# Patient Record
Sex: FEMALE | Race: WHITE | Employment: FULL TIME | URBAN - NONMETROPOLITAN AREA
[De-identification: names, ages, dates, MRNs, and addresses within clinical notes are randomized per-mention and may not be internally consistent; named-entity substitution may affect disease eponyms.]

---

## 2021-07-17 ENCOUNTER — HOSPITAL ENCOUNTER (EMERGENCY)
Age: 26
Discharge: HOME | End: 2021-07-17
Attending: EMERGENCY MEDICINE
Payer: COMMERCIAL

## 2021-07-17 VITALS
SYSTOLIC BLOOD PRESSURE: 118 MMHG | OXYGEN SATURATION: 99 % | RESPIRATION RATE: 16 BRPM | TEMPERATURE: 98.8 F | WEIGHT: 130 LBS | HEART RATE: 82 BPM | DIASTOLIC BLOOD PRESSURE: 82 MMHG

## 2021-07-17 DIAGNOSIS — N93.9 VAGINAL BLEEDING: Primary | ICD-10-CM

## 2021-07-17 LAB
ABO: NORMAL
ALBUMIN SERPL-MCNC: 4 GM/DL (ref 3.4–5)
ALP BLD-CCNC: 64 U/L (ref 46–116)
ALT SERPL-CCNC: 21 U/L (ref 14–63)
ANION GAP: 11 MEQ/L (ref 8–16)
AST SERPL-CCNC: 17 U/L (ref 15–37)
BASOPHILS # BLD: 0.4 % (ref 0–3)
BILIRUB SERPL-MCNC: 0.5 MG/DL (ref 0.2–1)
BUN BLDV-MCNC: 10 MG/DL (ref 7–18)
CHLORIDE BLD-SCNC: 108 MEQ/L (ref 98–107)
CO2: 25 MEQ/L (ref 21–32)
CREAT SERPL-MCNC: 0.8 MG/DL (ref 0.6–1.3)
EOSINOPHILS RELATIVE PERCENT: 1.7 % (ref 0–4)
GFR, ESTIMATED: > 90 ML/MIN/1.73M2
GLUCOSE BLD-MCNC: 97 MG/DL (ref 74–106)
HCG,BETA SUBUNIT,QUAL,SERUM: 5.2 MIU/ML (ref 0–5)
HCT VFR BLD CALC: 41.8 % (ref 37–47)
HEMOGLOBIN: 14 GM/DL (ref 12–16)
LYMPHOCYTES # BLD: 16.9 % (ref 15–47)
MCH RBC QN AUTO: 29.5 PG (ref 27–31)
MCHC RBC AUTO-ENTMCNC: 33.5 GM/DL (ref 33–37)
MCV RBC AUTO: 88.2 FL (ref 81–99)
MONOCYTES: 5.8 % (ref 0–12)
PDW BLD-RTO: 13.1 % (ref 11.5–14.5)
PLATELET # BLD: 290 THOU/MM3 (ref 130–400)
PMV BLD AUTO: 8.6 FL (ref 7.4–10.4)
POC CALCIUM: 8.6 MG/DL (ref 8.5–10.1)
POTASSIUM SERPL-SCNC: 3.9 MEQ/L (ref 3.5–5.1)
PREGNANCY, SERUM: NEGATIVE
RBC # BLD: 4.74 MILL/MM3 (ref 4.2–5.4)
RH FACTOR: NORMAL
SEGS: 75.2 % (ref 43–75)
SODIUM BLD-SCNC: 144 MEQ/L (ref 136–145)
TOTAL PROTEIN: 6.7 GM/DL (ref 6.4–8.2)
WBC # BLD: 8 THOU/MM3 (ref 4.8–10.8)

## 2021-07-17 PROCEDURE — 80053 COMPREHEN METABOLIC PANEL: CPT

## 2021-07-17 PROCEDURE — 85025 COMPLETE CBC W/AUTO DIFF WBC: CPT

## 2021-07-17 PROCEDURE — 2580000003 HC RX 258: Performed by: EMERGENCY MEDICINE

## 2021-07-17 PROCEDURE — 36415 COLL VENOUS BLD VENIPUNCTURE: CPT

## 2021-07-17 PROCEDURE — 84703 CHORIONIC GONADOTROPIN ASSAY: CPT

## 2021-07-17 PROCEDURE — 86900 BLOOD TYPING SEROLOGIC ABO: CPT

## 2021-07-17 PROCEDURE — 99283 EMERGENCY DEPT VISIT LOW MDM: CPT

## 2021-07-17 PROCEDURE — 86901 BLOOD TYPING SEROLOGIC RH(D): CPT

## 2021-07-17 PROCEDURE — 84702 CHORIONIC GONADOTROPIN TEST: CPT

## 2021-07-17 PROCEDURE — 96374 THER/PROPH/DIAG INJ IV PUSH: CPT

## 2021-07-17 RX ORDER — 0.9 % SODIUM CHLORIDE 0.9 %
1000 INTRAVENOUS SOLUTION INTRAVENOUS ONCE
Status: COMPLETED | OUTPATIENT
Start: 2021-07-17 | End: 2021-07-17

## 2021-07-17 RX ADMIN — SODIUM CHLORIDE 1000 ML: 9 INJECTION, SOLUTION INTRAVENOUS at 07:12

## 2021-07-17 ASSESSMENT — PAIN DESCRIPTION - ORIENTATION: ORIENTATION: LOWER

## 2021-07-17 ASSESSMENT — PAIN SCALES - GENERAL: PAINLEVEL_OUTOF10: 4

## 2021-07-17 ASSESSMENT — PAIN DESCRIPTION - LOCATION: LOCATION: ABDOMEN

## 2021-07-17 ASSESSMENT — PAIN DESCRIPTION - DESCRIPTORS: DESCRIPTORS: CRAMPING

## 2021-07-17 NOTE — ED NOTES
Report to [facility] RN and care assumed.       Harjinder [facility], RN  07/17/21 7017

## 2021-07-17 NOTE — ED PROVIDER NOTES
[address]  [place] [address]  Phone: [address]    Chief Complaint   Patient presents with    Vaginal Bleeding       HPI    Janeth Hudson is a 31 y.o. female who presents above-noted complaint. Patient presents with vaginal bleeding. She states she is missed her last cycle been trying to get pregnant and had home pregnancy test that was positive this was positive over a week ago. No other associate symptoms she developed some bleeding today. She had some cramping felt little heavier than normal.  Denies other symptoms at this time in regards to fever chills although felt a little shaky earlier and was not sure if she lost some blood. She denies unilateral pain although maybe feels a little funny on the right side although not severe pain or other problems. PAST MEDICAL HISTORY    History reviewed. No pertinent past medical history. SURGICAL HISTORY    History reviewed. No pertinent surgical history. CURRENT MEDICATIONS    Current Outpatient Rx   Medication Sig Dispense Refill    Prenatal MV-Min-Fe Fum-FA-DHA (PRENATAL 1 PO) Take by mouth         ALLERGIES    No Known Allergies    FAMILY HISTORY    History reviewed. No pertinent family history.     SOCIAL HISTORY    Social History     Socioeconomic History    Marital status:      Spouse name: None    Number of children: None    Years of education: None    Highest education level: None   Occupational History    None   Tobacco Use    Smoking status: Never Smoker    Smokeless tobacco: Never Used   Substance and Sexual Activity    Alcohol use: Never    Drug use: Never    Sexual activity: Never   Other Topics Concern    None   Social History Narrative    None     Social Determinants of Health     Financial Resource Strain:     Difficulty of Paying Living Expenses:    Food Insecurity:     Worried About Running Out of Food in the Last Year:     Ran Out of Food in the Last Year:    Transportation Needs:     Lack of Transportation (Medical):  Lack of Transportation (Non-Medical):    Physical Activity:     Days of Exercise per Week:     Minutes of Exercise per Session:    Stress:     Feeling of Stress :    Social Connections:     Frequency of Communication with Friends and Family:     Frequency of Social Gatherings with Friends and Family:     Attends Rastafari Services:     Active Member of Clubs or Organizations:     Attends Club or Organization Meetings:     Marital Status:    Intimate Partner Violence:     Fear of Current or Ex-Partner:     Emotionally Abused:     Physically Abused:     Sexually Abused:        REVIEW OF SYSTEMS    Positive for vaginal bleeding. No chest pain or shortness of breath. No nausea vomiting diaphoresis. All systems negative except as marked. PHYSICAL EXAM    VITAL SIGNS: /82   Pulse 82   Temp 98.8 °F (37.1 °C)   Resp 16   Wt 130 lb (59 kg)   SpO2 99%    Constitutional:  Alert not toxic or ill,   HENT: COVID mask protection in place normocephalic, Atraumatic, mask lowered to assess  Bilateral external ears normal, Oropharynx moist, No oral exudates, Nose normal.  Cervical Spine: Normal range of motion,  No stridor. No tenderness, Supple,  Eyes:  No discharge or  Swelling,Conjunctiva normal, PERRL, EOMI,  Respiratory: No respiratory distress, Normal breath sounds,  No wheezing, No chest tenderness. Cardiovascular:  Normal heart rate, Normal rhythm, No murmurs, No rubs, No gallops. GI:  No reproducible pain, Bowel sounds normal, Soft, No masses, No pulsatile masses. No tenderness  Musculoskeletal:  Intact distal pulses, No edema, No tenderness, No cyanosis, No clubbing. Good range of motion in all major joints. No tenderness to palpation or major deformities noted. Back:No tenderness.    Integument:  Warm, Dry, No erythema, No rash (on exposed areas)   Lymphatic:  No lymphadenopathy noted. Neurologic:  Alert & oriented x 3, Normal motor function, Normal sensory function, No focal deficits noted. Psychiatric:  Affect normal, Judgment normal, Mood normal.     EKG                      RADIOLOGY    No orders to display       PROCEDURES    none      CONSULTS:  None      CRITICAL CARE:  None    SCREENINGS  /82   Pulse 82   Temp 98.8 °F (37.1 °C)   Resp 16   Wt 130 lb (59 kg)   SpO2 99%        Screening For Hypertension and Follow-up (#317)  patient informed that blood pressure is normal but should always be re-assessed by primary care      Screening For Tobacco Use and Cessation Intervention (#226):   reports that she has never smoked. She has never used smokeless tobacco.  Non-smoker not applicable for screen      ED COURSE & MEDICAL DECISION MAKING    Pertinent Labs & Imaging studies reviewed. (See chart for details)  Patient presents with vaginal bleeding. No associate symptoms such as severe pain chest pain shortness of breath severe abdominal pain or other issues. She states she about 5 weeks pregnant. Her exam here is otherwise benign without peritonitis or unilateral pain. Counseled her in regards to threatened AB at this time. We are checking a blood count. She does not know her blood type. We are doing a quantitative hCG. I informed her this is a send out laboratory data and this will need a repeat in 2 days. REASSESSMENT  7:54 AM  Patient rechecked and updated on lab/xray status, progress and results. .  Patient was reassessed and condition was unchanged after tx. No further needs at this time. Lab tests including hemoglobin is unremarkable. Qualitative hCG is actually negative. Her numbers may actually just be too low. We have sent the quantitative hCG to main [place]. Recommend a repeat quantitative hCG in 2 days. Vital signs remained stable. FINAL IMPRESSION    1.  Vaginal bleeding         PATIENT REFERRED TO:  Yoel Francois, MD [address]  [address]  [phone]    Call   For evaluation      DISCHARGE MEDICATIONS:  New Prescriptions    No medications on file           Pratik Raymond MD  07/17/21 6226

## 2021-07-17 NOTE — ED TRIAGE NOTES
Pt states she is 5 weeks pregnant. Last evening she developed vaginal bleeding that was heavy at apx 2100 yesterday. Light bleeding and abdominal cramping has persisted.

## 2021-07-17 NOTE — ED NOTES
Pt resting, resp even and unlabored, skin pale, warm and dry. Pt states bleeding is minimal at this time. IV discontinued, 900ml's infused. Pt given discharge instructions and verbalizes understanding, pt released.      Tacos Lewis RN  07/17/21 9137

## 2021-10-11 ENCOUNTER — HOSPITAL ENCOUNTER (OUTPATIENT)
Age: 26
Discharge: HOME | End: 2021-10-11
Payer: COMMERCIAL

## 2021-10-11 PROCEDURE — 36415 COLL VENOUS BLD VENIPUNCTURE: CPT

## 2021-10-11 PROCEDURE — 84144 ASSAY OF PROGESTERONE: CPT

## 2021-10-11 PROCEDURE — 84702 CHORIONIC GONADOTROPIN TEST: CPT

## 2021-10-12 LAB
HCG,BETA SUBUNIT,QUAL,SERUM: 86.7 MIU/ML (ref 0–5)
PROGESTERONE LEVEL: 49.87 NG/ML

## 2021-10-13 ENCOUNTER — HOSPITAL ENCOUNTER (OUTPATIENT)
Age: 26
Discharge: HOME | End: 2021-10-13
Payer: COMMERCIAL

## 2021-10-13 LAB — HCG,BETA SUBUNIT,QUAL,SERUM: 320.6 MIU/ML (ref 0–5)

## 2021-10-13 PROCEDURE — 84702 CHORIONIC GONADOTROPIN TEST: CPT

## 2021-10-13 PROCEDURE — 36415 COLL VENOUS BLD VENIPUNCTURE: CPT

## 2021-11-12 ENCOUNTER — HOSPITAL ENCOUNTER (OUTPATIENT)
Age: 26
Discharge: HOME | End: 2021-11-12
Payer: COMMERCIAL

## 2021-11-12 LAB
ERYTHROCYTE [DISTWIDTH] IN BLOOD BY AUTOMATED COUNT: 13.6 % (ref 11.5–14.5)
ERYTHROCYTE [DISTWIDTH] IN BLOOD BY AUTOMATED COUNT: 46.5 FL (ref 35–45)
HCT VFR BLD CALC: 40.9 % (ref 37–47)
HEMOGLOBIN: 12.7 GM/DL (ref 12–16)
MCH RBC QN AUTO: 28.7 PG (ref 26–33)
MCHC RBC AUTO-ENTMCNC: 31.1 GM/DL (ref 32.2–35.5)
MCV RBC AUTO: 92.5 FL (ref 81–99)
PLATELET # BLD: 278 THOU/MM3 (ref 130–400)
PMV BLD AUTO: 11.7 FL (ref 9.4–12.4)
RBC # BLD: 4.42 MILL/MM3 (ref 4.2–5.4)
WBC # BLD: 9.2 THOU/MM3 (ref 4.8–10.8)

## 2021-11-12 PROCEDURE — 86900 BLOOD TYPING SEROLOGIC ABO: CPT

## 2021-11-12 PROCEDURE — 36415 COLL VENOUS BLD VENIPUNCTURE: CPT

## 2021-11-12 PROCEDURE — 86762 RUBELLA ANTIBODY: CPT

## 2021-11-12 PROCEDURE — 87389 HIV-1 AG W/HIV-1&-2 AB AG IA: CPT

## 2021-11-12 PROCEDURE — 87340 HEPATITIS B SURFACE AG IA: CPT

## 2021-11-12 PROCEDURE — 86803 HEPATITIS C AB TEST: CPT

## 2021-11-12 PROCEDURE — 85027 COMPLETE CBC AUTOMATED: CPT

## 2021-11-12 PROCEDURE — 86901 BLOOD TYPING SEROLOGIC RH(D): CPT

## 2021-11-12 PROCEDURE — 87086 URINE CULTURE/COLONY COUNT: CPT

## 2021-11-12 PROCEDURE — 86592 SYPHILIS TEST NON-TREP QUAL: CPT

## 2021-11-12 PROCEDURE — 86850 RBC ANTIBODY SCREEN: CPT

## 2021-11-13 LAB
ABO: NORMAL
ANTIBODY SCREEN: NORMAL
HEPATITIS B SURFACE ANTIGEN: NEGATIVE
HEPATITIS C ANTIBODY: NEGATIVE
HIV AG/AB: NONREACTIVE
RH FACTOR: NORMAL
RPR: NONREACTIVE
RUBELLA: 79.3 IU/ML

## 2021-11-14 LAB — URINE CULTURE, ROUTINE: NORMAL

## 2022-06-20 ENCOUNTER — HOSPITAL ENCOUNTER (INPATIENT)
Age: 27
LOS: 4 days | Discharge: HOME | End: 2022-06-24
Attending: OBSTETRICS & GYNECOLOGY | Admitting: OBSTETRICS & GYNECOLOGY
Payer: COMMERCIAL

## 2022-06-20 ENCOUNTER — APPOINTMENT (OUTPATIENT)
Dept: LABOR AND DELIVERY | Age: 27
End: 2022-06-20
Payer: COMMERCIAL

## 2022-06-20 PROBLEM — Z34.90 ENCOUNTER FOR ELECTIVE INDUCTION OF LABOR: Status: ACTIVE | Noted: 2022-06-20

## 2022-06-20 LAB
ABO: NORMAL
AMPHETAMINE+METHAMPHETAMINE URINE SCREEN: NEGATIVE
ANTIBODY SCREEN: NORMAL
BARBITURATE QUANTITATIVE URINE: NEGATIVE
BASOPHILS # BLD: 0.4 %
BASOPHILS ABSOLUTE: 0 THOU/MM3 (ref 0–0.1)
BENZODIAZEPINE QUANTITATIVE URINE: NEGATIVE
CANNABINOID QUANTITATIVE URINE: NEGATIVE
COCAINE METABOLITE QUANTITATIVE URINE: NEGATIVE
EOSINOPHIL # BLD: 0.5 %
EOSINOPHILS ABSOLUTE: 0 THOU/MM3 (ref 0–0.4)
ERYTHROCYTE [DISTWIDTH] IN BLOOD BY AUTOMATED COUNT: 14.4 % (ref 11.5–14.5)
ERYTHROCYTE [DISTWIDTH] IN BLOOD BY AUTOMATED COUNT: 46 FL (ref 35–45)
HCT VFR BLD CALC: 32.2 % (ref 37–47)
HEMOGLOBIN: 10.6 GM/DL (ref 12–16)
IMMATURE GRANS (ABS): 0.04 THOU/MM3 (ref 0–0.07)
IMMATURE GRANULOCYTES: 0.5 %
LYMPHOCYTES # BLD: 17.6 %
LYMPHOCYTES ABSOLUTE: 1.4 THOU/MM3 (ref 1–4.8)
MCH RBC QN AUTO: 29.5 PG (ref 26–33)
MCHC RBC AUTO-ENTMCNC: 32.9 GM/DL (ref 32.2–35.5)
MCV RBC AUTO: 89.7 FL (ref 81–99)
MONOCYTES # BLD: 6.1 %
MONOCYTES ABSOLUTE: 0.5 THOU/MM3 (ref 0.4–1.3)
NUCLEATED RED BLOOD CELLS: 0 /100 WBC
OPIATES, URINE: NEGATIVE
OXYCODONE: NEGATIVE
PHENCYCLIDINE QUANTITATIVE URINE: NEGATIVE
PLATELET # BLD: 197 THOU/MM3 (ref 130–400)
PMV BLD AUTO: 12.3 FL (ref 9.4–12.4)
RBC # BLD: 3.59 MILL/MM3 (ref 4.2–5.4)
RH FACTOR: NORMAL
SEG NEUTROPHILS: 74.9 %
SEGMENTED NEUTROPHILS ABSOLUTE COUNT: 5.8 THOU/MM3 (ref 1.8–7.7)
WBC # BLD: 7.7 THOU/MM3 (ref 4.8–10.8)

## 2022-06-20 PROCEDURE — 80307 DRUG TEST PRSMV CHEM ANLYZR: CPT

## 2022-06-20 PROCEDURE — 86900 BLOOD TYPING SEROLOGIC ABO: CPT

## 2022-06-20 PROCEDURE — 86901 BLOOD TYPING SEROLOGIC RH(D): CPT

## 2022-06-20 PROCEDURE — 3E033VJ INTRODUCTION OF OTHER HORMONE INTO PERIPHERAL VEIN, PERCUTANEOUS APPROACH: ICD-10-PCS | Performed by: OBSTETRICS & GYNECOLOGY

## 2022-06-20 PROCEDURE — 6360000002 HC RX W HCPCS

## 2022-06-20 PROCEDURE — 6360000002 HC RX W HCPCS: Performed by: OBSTETRICS & GYNECOLOGY

## 2022-06-20 PROCEDURE — 86850 RBC ANTIBODY SCREEN: CPT

## 2022-06-20 PROCEDURE — 85025 COMPLETE CBC W/AUTO DIFF WBC: CPT

## 2022-06-20 PROCEDURE — 2580000003 HC RX 258: Performed by: OBSTETRICS & GYNECOLOGY

## 2022-06-20 PROCEDURE — 86592 SYPHILIS TEST NON-TREP QUAL: CPT

## 2022-06-20 PROCEDURE — 1220000001 HC SEMI PRIVATE L&D R&B

## 2022-06-20 RX ORDER — SEVOFLURANE 250 ML/250ML
1 LIQUID RESPIRATORY (INHALATION) CONTINUOUS PRN
Status: DISCONTINUED | OUTPATIENT
Start: 2022-06-20 | End: 2022-06-22 | Stop reason: HOSPADM

## 2022-06-20 RX ORDER — IBUPROFEN 800 MG/1
800 TABLET ORAL EVERY 8 HOURS PRN
Status: DISCONTINUED | OUTPATIENT
Start: 2022-06-20 | End: 2022-06-22 | Stop reason: HOSPADM

## 2022-06-20 RX ORDER — CARBOPROST TROMETHAMINE 250 UG/ML
250 INJECTION, SOLUTION INTRAMUSCULAR PRN
Status: DISCONTINUED | OUTPATIENT
Start: 2022-06-20 | End: 2022-06-22 | Stop reason: HOSPADM

## 2022-06-20 RX ORDER — ONDANSETRON 2 MG/ML
8 INJECTION INTRAMUSCULAR; INTRAVENOUS EVERY 6 HOURS PRN
Status: DISCONTINUED | OUTPATIENT
Start: 2022-06-20 | End: 2022-06-22 | Stop reason: HOSPADM

## 2022-06-20 RX ORDER — METHYLERGONOVINE MALEATE 0.2 MG/ML
200 INJECTION INTRAVENOUS PRN
Status: DISCONTINUED | OUTPATIENT
Start: 2022-06-20 | End: 2022-06-22 | Stop reason: HOSPADM

## 2022-06-20 RX ORDER — ACETAMINOPHEN 325 MG/1
650 TABLET ORAL EVERY 4 HOURS PRN
Status: DISCONTINUED | OUTPATIENT
Start: 2022-06-20 | End: 2022-06-22 | Stop reason: HOSPADM

## 2022-06-20 RX ORDER — SODIUM CHLORIDE, SODIUM LACTATE, POTASSIUM CHLORIDE, CALCIUM CHLORIDE 600; 310; 30; 20 MG/100ML; MG/100ML; MG/100ML; MG/100ML
INJECTION, SOLUTION INTRAVENOUS CONTINUOUS
Status: DISCONTINUED | OUTPATIENT
Start: 2022-06-20 | End: 2022-06-22

## 2022-06-20 RX ORDER — SODIUM CHLORIDE, SODIUM LACTATE, POTASSIUM CHLORIDE, AND CALCIUM CHLORIDE .6; .31; .03; .02 G/100ML; G/100ML; G/100ML; G/100ML
1000 INJECTION, SOLUTION INTRAVENOUS PRN
Status: DISCONTINUED | OUTPATIENT
Start: 2022-06-20 | End: 2022-06-22 | Stop reason: HOSPADM

## 2022-06-20 RX ORDER — LIDOCAINE HYDROCHLORIDE 10 MG/ML
30 INJECTION, SOLUTION EPIDURAL; INFILTRATION; INTRACAUDAL; PERINEURAL PRN
Status: DISCONTINUED | OUTPATIENT
Start: 2022-06-20 | End: 2022-06-22 | Stop reason: HOSPADM

## 2022-06-20 RX ORDER — MORPHINE SULFATE 2 MG/ML
2 INJECTION, SOLUTION INTRAMUSCULAR; INTRAVENOUS
Status: DISCONTINUED | OUTPATIENT
Start: 2022-06-20 | End: 2022-06-22 | Stop reason: HOSPADM

## 2022-06-20 RX ORDER — DIPHENHYDRAMINE HYDROCHLORIDE 50 MG/ML
25 INJECTION INTRAMUSCULAR; INTRAVENOUS EVERY 4 HOURS PRN
Status: DISCONTINUED | OUTPATIENT
Start: 2022-06-20 | End: 2022-06-22 | Stop reason: HOSPADM

## 2022-06-20 RX ORDER — MISOPROSTOL 200 UG/1
1000 TABLET ORAL PRN
Status: DISCONTINUED | OUTPATIENT
Start: 2022-06-20 | End: 2022-06-22 | Stop reason: HOSPADM

## 2022-06-20 RX ORDER — TERBUTALINE SULFATE 1 MG/ML
0.25 INJECTION, SOLUTION SUBCUTANEOUS
Status: ACTIVE | OUTPATIENT
Start: 2022-06-20 | End: 2022-06-20

## 2022-06-20 RX ORDER — SODIUM CHLORIDE, SODIUM LACTATE, POTASSIUM CHLORIDE, AND CALCIUM CHLORIDE .6; .31; .03; .02 G/100ML; G/100ML; G/100ML; G/100ML
500 INJECTION, SOLUTION INTRAVENOUS PRN
Status: DISCONTINUED | OUTPATIENT
Start: 2022-06-20 | End: 2022-06-22 | Stop reason: HOSPADM

## 2022-06-20 RX ORDER — PENICILLIN G 3000000 [IU]/50ML
3 INJECTION, SOLUTION INTRAVENOUS EVERY 4 HOURS
Status: DISCONTINUED | OUTPATIENT
Start: 2022-06-20 | End: 2022-06-22 | Stop reason: HOSPADM

## 2022-06-20 RX ORDER — MORPHINE SULFATE 4 MG/ML
4 INJECTION, SOLUTION INTRAMUSCULAR; INTRAVENOUS
Status: DISCONTINUED | OUTPATIENT
Start: 2022-06-20 | End: 2022-06-22 | Stop reason: HOSPADM

## 2022-06-20 RX ORDER — BUTORPHANOL TARTRATE 1 MG/ML
1 INJECTION, SOLUTION INTRAMUSCULAR; INTRAVENOUS
Status: DISCONTINUED | OUTPATIENT
Start: 2022-06-20 | End: 2022-06-22 | Stop reason: HOSPADM

## 2022-06-20 RX ADMIN — ONDANSETRON 8 MG: 2 INJECTION INTRAMUSCULAR; INTRAVENOUS at 19:11

## 2022-06-20 RX ADMIN — SODIUM CHLORIDE, POTASSIUM CHLORIDE, SODIUM LACTATE AND CALCIUM CHLORIDE: 600; 310; 30; 20 INJECTION, SOLUTION INTRAVENOUS at 23:29

## 2022-06-20 RX ADMIN — SODIUM CHLORIDE, POTASSIUM CHLORIDE, SODIUM LACTATE AND CALCIUM CHLORIDE: 600; 310; 30; 20 INJECTION, SOLUTION INTRAVENOUS at 16:20

## 2022-06-20 RX ADMIN — PENICILLIN G 3 MILLION UNITS: 3000000 INJECTION, SOLUTION INTRAVENOUS at 20:53

## 2022-06-20 RX ADMIN — BUTORPHANOL TARTRATE 1 MG: 1 INJECTION, SOLUTION INTRAMUSCULAR; INTRAVENOUS at 19:11

## 2022-06-20 RX ADMIN — DEXTROSE MONOHYDRATE 5 MILLION UNITS: 5 INJECTION INTRAVENOUS at 17:28

## 2022-06-20 RX ADMIN — Medication 1 MILLI-UNITS/MIN: at 18:47

## 2022-06-20 ASSESSMENT — PAIN DESCRIPTION - LOCATION: LOCATION: ABDOMEN

## 2022-06-20 ASSESSMENT — PAIN DESCRIPTION - ORIENTATION: ORIENTATION: MID;LOWER

## 2022-06-20 ASSESSMENT — PAIN DESCRIPTION - DESCRIPTORS
DESCRIPTORS: CRAMPING
DESCRIPTORS: CRAMPING;DISCOMFORT

## 2022-06-20 ASSESSMENT — PAIN SCALES - GENERAL: PAINLEVEL_OUTOF10: 5

## 2022-06-20 NOTE — FLOWSHEET NOTE
Dr Tiesha Rayo returned page states to increase pitocin to 2 MU and hold till 4 am then increase per policy.

## 2022-06-20 NOTE — FLOWSHEET NOTE
Dillard ripening bulb placed per Dr Cory Limon. Balloon filled with 60 ml NS. Catheter secured to pts leg. Infusion of 0.9 NS started at 60 ml/hr via pump.   Pt tolerated well

## 2022-06-20 NOTE — PLAN OF CARE
Problem: Pain  Goal: Verbalizes/displays adequate comfort level or baseline comfort level  Outcome: Progressing  Flowsheets (Taken 2022)  Verbalizes/displays adequate comfort level or baseline comfort level:   Encourage patient to monitor pain and request assistance   Assess pain using appropriate pain scale     Problem: Vaginal Birth or  Section  Goal: Fetal and maternal status remain reassuring during the birth process  Description:  Birth OB-Pregnancy care plan goal which identifies if the fetal and maternal status remain reassuring during the birth process  Outcome: Progressing  Flowsheets (Taken 2022)  Fetal and Maternal Status Remain Reassuring During the Birth Process:   Monitor vital signs   Monitor uterine activity   Monitor fetal heart rate     Problem: Infection - Adult  Goal: Absence of fever/infection during anticipated neutropenic period  Outcome: Progressing  Flowsheets (Taken 2022)  Absence of fever/infection during anticipated neutropenic period: (monitor VS as ordered) --     Problem: Safety - Adult  Goal: Free from fall injury  Outcome: Progressing  Flowsheets (Taken 2022)  Free From Fall Injury: Based on caregiver fall risk screen, instruct family/caregiver to ask for assistance with transferring infant if caregiver noted to have fall risk factors     Problem: Discharge Planning  Goal: Discharge to home or other facility with appropriate resources  Outcome: Progressing  Flowsheets (Taken 2022)  Discharge to home or other facility with appropriate resources:   Identify barriers to discharge with patient and caregiver   Arrange for interpreters to assist at discharge as needed       Care plan reviewed with patient and her spouse Conor Benavides. Patient and her spouse Conor Benavides verbalize understanding of the plan of care and contribute to goal setting.

## 2022-06-20 NOTE — H&P
[address] [address]  History and Physical Update    Pt Name: Irma Montgomery  MRN: [id number]  YOB: 1995  Date of evaluation: 2022    [] I have examined the patient and reviewed the H&P/Consult and there are no changes to the patient or plans. [x] I have examined the patient and reviewed the H&P/Consult and have noted the following changes:   30 yo  here for IOL at term  cvx 1-2 60 -2  Ctx irreg  FHTs reactive  Risks, benefits and alternatives of cronin induction of labor reviewed and patient agrees. Cronin placed without difficulty    Domenic Zavala MD, MD    Discussion with the patient and/ or family for proposed care, treatment, services; benefits, risks, side effects; likelihood of achieving goals and potential problems that may occur during recuperation was had and all questions were answered. Discussion with the patient and/ or family of reasonable alternatives to the proposed care, treatment, services and the discussion of the risks, benefits, side effects related to the alternatives and the risk related to not receiving the proposed care treatment services was also had and all questions were answered. If this is for an elective surgical procedure then The patient was counseled at length about the risks of rachel Covid-19 during their perioperative period and any recovery window from their procedure. The patient was made aware that rachel Covid-19  may worsen their prognosis for recovering from their procedure  and lend to a higher morbidity and/or mortality risk. All material risks, benefits, and reasonable alternatives including postponing the procedure were discussed. The patient  does wish to proceed with the procedure at this time.              Domenic Zavala MD,MD  Electronically signed 2022 at 7:10 PM

## 2022-06-20 NOTE — FLOWSHEET NOTE
Dr Juan Manuel Logan in department updated Dr Karel Levy has placed cronin ripening bulb. RN to send Dr Karel Levy for pitocin orders.

## 2022-06-20 NOTE — FLOWSHEET NOTE
Dr Sukhi Doe returned page states Dr Lucia Barraza is aware of pt and will be over to place cronin ripening bulb after office hours.  Orders received

## 2022-06-21 ENCOUNTER — ANESTHESIA (OUTPATIENT)
Dept: LABOR AND DELIVERY | Age: 27
End: 2022-06-21
Payer: COMMERCIAL

## 2022-06-21 ENCOUNTER — ANESTHESIA EVENT (OUTPATIENT)
Dept: LABOR AND DELIVERY | Age: 27
End: 2022-06-21
Payer: COMMERCIAL

## 2022-06-21 LAB — RPR: NONREACTIVE

## 2022-06-21 PROCEDURE — 2500000003 HC RX 250 WO HCPCS

## 2022-06-21 PROCEDURE — 3700000000 HC ANESTHESIA ATTENDED CARE: Performed by: OBSTETRICS & GYNECOLOGY

## 2022-06-21 PROCEDURE — 2500000003 HC RX 250 WO HCPCS: Performed by: NURSE ANESTHETIST, CERTIFIED REGISTERED

## 2022-06-21 PROCEDURE — 2500000003 HC RX 250 WO HCPCS: Performed by: ANESTHESIOLOGY

## 2022-06-21 PROCEDURE — 6360000002 HC RX W HCPCS: Performed by: REGISTERED NURSE

## 2022-06-21 PROCEDURE — 6360000002 HC RX W HCPCS: Performed by: NURSE ANESTHETIST, CERTIFIED REGISTERED

## 2022-06-21 PROCEDURE — 2580000003 HC RX 258: Performed by: OBSTETRICS & GYNECOLOGY

## 2022-06-21 PROCEDURE — 1220000001 HC SEMI PRIVATE L&D R&B

## 2022-06-21 PROCEDURE — 6370000000 HC RX 637 (ALT 250 FOR IP): Performed by: OBSTETRICS & GYNECOLOGY

## 2022-06-21 PROCEDURE — 3609079900 HC CESAREAN SECTION: Performed by: OBSTETRICS & GYNECOLOGY

## 2022-06-21 PROCEDURE — 6360000002 HC RX W HCPCS

## 2022-06-21 PROCEDURE — 3700000025 EPIDURAL BLOCK: Performed by: ANESTHESIOLOGY

## 2022-06-21 PROCEDURE — 6370000000 HC RX 637 (ALT 250 FOR IP)

## 2022-06-21 PROCEDURE — 7100000000 HC PACU RECOVERY - FIRST 15 MIN: Performed by: OBSTETRICS & GYNECOLOGY

## 2022-06-21 PROCEDURE — 7100000001 HC PACU RECOVERY - ADDTL 15 MIN: Performed by: OBSTETRICS & GYNECOLOGY

## 2022-06-21 PROCEDURE — 3700000001 HC ADD 15 MINUTES (ANESTHESIA): Performed by: OBSTETRICS & GYNECOLOGY

## 2022-06-21 PROCEDURE — 2709999900 HC NON-CHARGEABLE SUPPLY: Performed by: OBSTETRICS & GYNECOLOGY

## 2022-06-21 PROCEDURE — 6360000002 HC RX W HCPCS: Performed by: OBSTETRICS & GYNECOLOGY

## 2022-06-21 RX ORDER — FAMOTIDINE 10 MG/ML
20 INJECTION, SOLUTION INTRAVENOUS ONCE
Status: COMPLETED | OUTPATIENT
Start: 2022-06-21 | End: 2022-06-21

## 2022-06-21 RX ORDER — ACETAMINOPHEN 325 MG/1
975 TABLET ORAL ONCE
Status: COMPLETED | OUTPATIENT
Start: 2022-06-21 | End: 2022-06-21

## 2022-06-21 RX ORDER — ONDANSETRON 2 MG/ML
4 INJECTION INTRAMUSCULAR; INTRAVENOUS EVERY 6 HOURS PRN
Status: DISCONTINUED | OUTPATIENT
Start: 2022-06-21 | End: 2022-06-22 | Stop reason: HOSPADM

## 2022-06-21 RX ORDER — MORPHINE SULFATE 0.5 MG/ML
INJECTION, SOLUTION EPIDURAL; INTRATHECAL; INTRAVENOUS PRN
Status: DISCONTINUED | OUTPATIENT
Start: 2022-06-21 | End: 2022-06-22 | Stop reason: SDUPTHER

## 2022-06-21 RX ORDER — ROPIVACAINE HYDROCHLORIDE 2 MG/ML
INJECTION, SOLUTION EPIDURAL; INFILTRATION; PERINEURAL
Status: COMPLETED
Start: 2022-06-21 | End: 2022-06-21

## 2022-06-21 RX ORDER — OXYTOCIN 10 [USP'U]/ML
INJECTION, SOLUTION INTRAMUSCULAR; INTRAVENOUS PRN
Status: DISCONTINUED | OUTPATIENT
Start: 2022-06-21 | End: 2022-06-22 | Stop reason: SDUPTHER

## 2022-06-21 RX ORDER — ROPIVACAINE HYDROCHLORIDE 2 MG/ML
INJECTION, SOLUTION EPIDURAL; INFILTRATION; PERINEURAL PRN
Status: DISCONTINUED | OUTPATIENT
Start: 2022-06-21 | End: 2022-06-22 | Stop reason: SDUPTHER

## 2022-06-21 RX ORDER — FENTANYL CITRATE 50 UG/ML
INJECTION, SOLUTION INTRAMUSCULAR; INTRAVENOUS
Status: COMPLETED
Start: 2022-06-21 | End: 2022-06-21

## 2022-06-21 RX ORDER — FAMOTIDINE 10 MG/ML
INJECTION, SOLUTION INTRAVENOUS
Status: COMPLETED
Start: 2022-06-21 | End: 2022-06-21

## 2022-06-21 RX ORDER — AZITHROMYCIN 500 MG/1
INJECTION, POWDER, LYOPHILIZED, FOR SOLUTION INTRAVENOUS
Status: COMPLETED
Start: 2022-06-21 | End: 2022-06-21

## 2022-06-21 RX ORDER — AZITHROMYCIN 500 MG/1
INJECTION, POWDER, LYOPHILIZED, FOR SOLUTION INTRAVENOUS PRN
Status: DISCONTINUED | OUTPATIENT
Start: 2022-06-21 | End: 2022-06-22 | Stop reason: SDUPTHER

## 2022-06-21 RX ORDER — TRISODIUM CITRATE DIHYDRATE AND CITRIC ACID MONOHYDRATE 500; 334 MG/5ML; MG/5ML
15 SOLUTION ORAL ONCE
Status: COMPLETED | OUTPATIENT
Start: 2022-06-21 | End: 2022-06-21

## 2022-06-21 RX ORDER — LIDOCAINE HYDROCHLORIDE AND EPINEPHRINE 20; 5 MG/ML; UG/ML
INJECTION, SOLUTION EPIDURAL; INFILTRATION; INTRACAUDAL; PERINEURAL PRN
Status: DISCONTINUED | OUTPATIENT
Start: 2022-06-21 | End: 2022-06-22 | Stop reason: SDUPTHER

## 2022-06-21 RX ORDER — NALOXONE HYDROCHLORIDE 0.4 MG/ML
INJECTION, SOLUTION INTRAMUSCULAR; INTRAVENOUS; SUBCUTANEOUS PRN
Status: DISCONTINUED | OUTPATIENT
Start: 2022-06-21 | End: 2022-06-22 | Stop reason: HOSPADM

## 2022-06-21 RX ORDER — METOCLOPRAMIDE HYDROCHLORIDE 5 MG/ML
10 INJECTION INTRAMUSCULAR; INTRAVENOUS ONCE
Status: COMPLETED | OUTPATIENT
Start: 2022-06-21 | End: 2022-06-21

## 2022-06-21 RX ORDER — ONDANSETRON 2 MG/ML
INJECTION INTRAMUSCULAR; INTRAVENOUS PRN
Status: DISCONTINUED | OUTPATIENT
Start: 2022-06-21 | End: 2022-06-22 | Stop reason: SDUPTHER

## 2022-06-21 RX ORDER — METOCLOPRAMIDE HYDROCHLORIDE 5 MG/ML
INJECTION INTRAMUSCULAR; INTRAVENOUS
Status: COMPLETED
Start: 2022-06-21 | End: 2022-06-21

## 2022-06-21 RX ORDER — TRISODIUM CITRATE DIHYDRATE AND CITRIC ACID MONOHYDRATE 500; 334 MG/5ML; MG/5ML
SOLUTION ORAL
Status: COMPLETED
Start: 2022-06-21 | End: 2022-06-21

## 2022-06-21 RX ORDER — FENTANYL CITRATE 50 UG/ML
INJECTION, SOLUTION INTRAMUSCULAR; INTRAVENOUS PRN
Status: DISCONTINUED | OUTPATIENT
Start: 2022-06-21 | End: 2022-06-22 | Stop reason: SDUPTHER

## 2022-06-21 RX ORDER — KETOROLAC TROMETHAMINE 30 MG/ML
INJECTION, SOLUTION INTRAMUSCULAR; INTRAVENOUS PRN
Status: DISCONTINUED | OUTPATIENT
Start: 2022-06-21 | End: 2022-06-22 | Stop reason: SDUPTHER

## 2022-06-21 RX ADMIN — FENTANYL CITRATE 100 MCG: 50 INJECTION, SOLUTION INTRAMUSCULAR; INTRAVENOUS at 22:02

## 2022-06-21 RX ADMIN — TRISODIUM CITRATE DIHYDRATE AND CITRIC ACID MONOHYDRATE 15 ML: 500; 334 SOLUTION ORAL at 23:06

## 2022-06-21 RX ADMIN — ROPIVACAINE HYDROCHLORIDE 10 ML: 2 INJECTION, SOLUTION EPIDURAL; INFILTRATION at 16:32

## 2022-06-21 RX ADMIN — ROPIVACAINE HYDROCHLORIDE 10 ML: 2 INJECTION, SOLUTION EPIDURAL; INFILTRATION at 09:34

## 2022-06-21 RX ADMIN — PENICILLIN G 3 MILLION UNITS: 3000000 INJECTION, SOLUTION INTRAVENOUS at 08:15

## 2022-06-21 RX ADMIN — FAMOTIDINE 20 MG: 10 INJECTION, SOLUTION INTRAVENOUS at 23:07

## 2022-06-21 RX ADMIN — PENICILLIN G 3 MILLION UNITS: 3000000 INJECTION, SOLUTION INTRAVENOUS at 20:22

## 2022-06-21 RX ADMIN — MORPHINE SULFATE 2.5 MG: 0.5 INJECTION, SOLUTION EPIDURAL; INTRATHECAL; INTRAVENOUS at 23:42

## 2022-06-21 RX ADMIN — KETOROLAC TROMETHAMINE 30 MG: 30 INJECTION, SOLUTION INTRAMUSCULAR at 23:57

## 2022-06-21 RX ADMIN — PENICILLIN G 3 MILLION UNITS: 3000000 INJECTION, SOLUTION INTRAVENOUS at 04:19

## 2022-06-21 RX ADMIN — OXYTOCIN 20 UNITS: 10 INJECTION INTRAVENOUS at 23:40

## 2022-06-21 RX ADMIN — SODIUM CITRATE AND CITRIC ACID MONOHYDRATE 15 ML: 500; 334 SOLUTION ORAL at 23:06

## 2022-06-21 RX ADMIN — PENICILLIN G 3 MILLION UNITS: 3000000 INJECTION, SOLUTION INTRAVENOUS at 00:02

## 2022-06-21 RX ADMIN — ROPIVACAINE HYDROCHLORIDE 8 ML: 2 INJECTION, SOLUTION EPIDURAL; INFILTRATION at 22:02

## 2022-06-21 RX ADMIN — ONDANSETRON 4 MG: 2 INJECTION INTRAMUSCULAR; INTRAVENOUS at 23:24

## 2022-06-21 RX ADMIN — PENICILLIN G 3 MILLION UNITS: 3000000 INJECTION, SOLUTION INTRAVENOUS at 16:24

## 2022-06-21 RX ADMIN — LIDOCAINE HYDROCHLORIDE,EPINEPHRINE BITARTRATE 10 ML: 20; .005 INJECTION, SOLUTION EPIDURAL; INFILTRATION; INTRACAUDAL; PERINEURAL at 23:17

## 2022-06-21 RX ADMIN — SODIUM CHLORIDE, POTASSIUM CHLORIDE, SODIUM LACTATE AND CALCIUM CHLORIDE: 600; 310; 30; 20 INJECTION, SOLUTION INTRAVENOUS at 18:27

## 2022-06-21 RX ADMIN — PHENYLEPHRINE HYDROCHLORIDE 100 MCG: 10 INJECTION INTRAVENOUS at 23:31

## 2022-06-21 RX ADMIN — FENTANYL CITRATE 100 MCG: 50 INJECTION, SOLUTION INTRAMUSCULAR; INTRAVENOUS at 16:32

## 2022-06-21 RX ADMIN — PENICILLIN G 3 MILLION UNITS: 3000000 INJECTION, SOLUTION INTRAVENOUS at 12:37

## 2022-06-21 RX ADMIN — LIDOCAINE HYDROCHLORIDE,EPINEPHRINE BITARTRATE 10 ML: 20; .005 INJECTION, SOLUTION EPIDURAL; INFILTRATION; INTRACAUDAL; PERINEURAL at 23:23

## 2022-06-21 RX ADMIN — SODIUM CHLORIDE, POTASSIUM CHLORIDE, SODIUM LACTATE AND CALCIUM CHLORIDE: 600; 310; 30; 20 INJECTION, SOLUTION INTRAVENOUS at 23:37

## 2022-06-21 RX ADMIN — SODIUM CHLORIDE, POTASSIUM CHLORIDE, SODIUM LACTATE AND CALCIUM CHLORIDE: 600; 310; 30; 20 INJECTION, SOLUTION INTRAVENOUS at 07:23

## 2022-06-21 RX ADMIN — SODIUM CHLORIDE, POTASSIUM CHLORIDE, SODIUM LACTATE AND CALCIUM CHLORIDE: 600; 310; 30; 20 INJECTION, SOLUTION INTRAVENOUS at 12:59

## 2022-06-21 RX ADMIN — ACETAMINOPHEN 975 MG: 325 TABLET ORAL at 23:10

## 2022-06-21 RX ADMIN — MORPHINE SULFATE 2.5 MG: 0.5 INJECTION, SOLUTION EPIDURAL; INTRATHECAL; INTRAVENOUS at 23:50

## 2022-06-21 RX ADMIN — AZITHROMYCIN DIHYDRATE 500 MG: 500 INJECTION, POWDER, LYOPHILIZED, FOR SOLUTION INTRAVENOUS at 23:22

## 2022-06-21 RX ADMIN — METOCLOPRAMIDE 10 MG: 5 INJECTION, SOLUTION INTRAMUSCULAR; INTRAVENOUS at 23:07

## 2022-06-21 RX ADMIN — METOCLOPRAMIDE HYDROCHLORIDE 10 MG: 5 INJECTION INTRAMUSCULAR; INTRAVENOUS at 23:07

## 2022-06-21 RX ADMIN — Medication 16 ML/HR: at 09:37

## 2022-06-21 ASSESSMENT — PAIN DESCRIPTION - DESCRIPTORS
DESCRIPTORS: CRAMPING

## 2022-06-21 NOTE — PLAN OF CARE
Problem: Pain  Goal: Verbalizes/displays adequate comfort level or baseline comfort level  2022 by Dedrick Nina RN  Flowsheets (Taken 2022 by Jessie Coronado RN)  Verbalizes/displays adequate comfort level or baseline comfort level:   Encourage patient to monitor pain and request assistance   Assess pain using appropriate pain scale     Problem: Vaginal Birth or  Section  Goal: Fetal and maternal status remain reassuring during the birth process  Description:  Birth OB-Pregnancy care plan goal which identifies if the fetal and maternal status remain reassuring during the birth process  2022 by Dedrick Nina RN  Flowsheets (Taken 2022)  Fetal and Maternal Status Remain Reassuring During the Birth Process:   Monitor vital signs   Monitor uterine activity   Monitor fetal heart rate   Monitor labor progression (Vaginal delivery)     Problem: Infection - Adult  Goal: Absence of fever/infection during anticipated neutropenic period  2022 by Dedrick Nina RN  Flowsheets (Taken 2022 by Jessie Coronado RN)  Absence of fever/infection during anticipated neutropenic period: (monitor VS as ordered) --     Problem: Safety - Adult  Goal: Free from fall injury  2022 by Dedrick Nina RN  Flowsheets (Taken 2022 by Jessie Coronado RN)  Free From Fall Injury: Based on caregiver fall risk screen, instruct family/caregiver to ask for assistance with transferring infant if caregiver noted to have fall risk factors     Problem: Discharge Planning  Goal: Discharge to home or other facility with appropriate resources  2022 by Dedrick Nina RN  Flowsheets (Taken 2022)  Discharge to home or other facility with appropriate resources:   Identify barriers to discharge with patient and caregiver   Arrange for needed discharge resources and transportation as appropriate   Identify discharge

## 2022-06-21 NOTE — PLAN OF CARE
Problem: Pain  Goal: Verbalizes/displays adequate comfort level or baseline comfort level  2022 by Dexter Carranza RN  Flowsheets (Taken 2022 172 by Jovan Bullock RN)  Verbalizes/displays adequate comfort level or baseline comfort level:   Encourage patient to monitor pain and request assistance   Assess pain using appropriate pain scale     Problem: Vaginal Birth or  Section  Goal: Fetal and maternal status remain reassuring during the birth process  Description:  Birth OB-Pregnancy care plan goal which identifies if the fetal and maternal status remain reassuring during the birth process  2022 by Dexter Carranza RN  Flowsheets (Taken 2022 2338)  Fetal and Maternal Status Remain Reassuring During the Birth Process:   Monitor vital signs   Monitor uterine activity   Monitor fetal heart rate   Monitor labor progression (Vaginal delivery)     Problem: Infection - Adult  Goal: Absence of fever/infection during anticipated neutropenic period  2022 by Dexter Carranza RN  Flowsheets (Taken 2022 172 by Jovan Bullock RN)  Absence of fever/infection during anticipated neutropenic period: (monitor VS as ordered) --     Problem: Safety - Adult  Goal: Free from fall injury  2022 by Dexter Carranza RN  Flowsheets (Taken 2022 172 by Jovan Bullock RN)  Free From Fall Injury: Based on caregiver fall risk screen, instruct family/caregiver to ask for assistance with transferring infant if caregiver noted to have fall risk factors     Problem: Discharge Planning  Goal: Discharge to home or other facility with appropriate resources  2022 by Dexter Carranza RN  Flowsheets (Taken 2022 2338)  Discharge to home or other facility with appropriate resources:   Identify barriers to discharge with patient and caregiver   Arrange for needed discharge resources and transportation as appropriate   Identify discharge

## 2022-06-21 NOTE — PLAN OF CARE
Problem: Pain  Goal: Verbalizes/displays adequate comfort level or baseline comfort level  2022 by Rosi Jain RN  Flow  Problem: Vaginal Birth or  Section  Goal: Fetal and maternal status remain reassuring during the birth process  Description:  Birth OB-Pregnancy care plan goal which identifies if the fetal and maternal status remain reassuring during the birth process  2022 by Rosi Jain RN  Fetal and Maternal Status Remain Reassuring During the Birth Process:   Monitor vital signs   Monitor uterine activity   Monitor fetal heart rate   Monitor labor progression (Vaginal delivery)     Problem: Infection - Adult  Goal: Absence of fever/infection during anticipated neutropenic period  2022 by Rosi Jain RN  Absence of fever/infection during anticipated neutropenic period: (monitor VS as ordered) --     Problem: Safety - Adult  Goal: Free from fall injury  2022 by Rosi Jain RN  Free From Fall Injury: Based on caregiver fall risk screen, instruct family/caregiver to ask for assistance with transferring infant if caregiver noted to have fall risk factors     Problem: Discharge Planning  Goal: Discharge to home or other facility with appropriate resources  2022 by Rosi Jain RN  Discharge to home or other facility with appropriate resources:   Identify barriers to discharge with patient and caregiver   Arrange for needed discharge resources and transportation as appropriate   Identify discharge learning needs (meds, wound care, etc)   sheets (Taken 2022 1721 by Jese Ku RN)  Verbalizes/displays adequate comfort level or baseline comfort level:   Encourage patient to monitor pain and request assistance   Assess pain using appropriate pain scale

## 2022-06-21 NOTE — FLOWSHEET NOTE
Cervical cronin fell out of cervix. Patient tolerated well, chux pad changed and patient assisted up to RR. Will continue to monitor.

## 2022-06-21 NOTE — FLOWSHEET NOTE
Dr Alec Holman returned page to unit, updated on FHTs, VD noted through the day, improved with interventions, currently in knee/chest position with recurrent VD, continued moderate variability. Last VE 7/90/-1, clear fluid. Orders received.

## 2022-06-21 NOTE — ANESTHESIA PRE PROCEDURE
Department of Anesthesiology  Preprocedure Note       Name:  Xiomara Russ   Age:  31 y.o.  :  1995                                          MRN:  [id number]         Date:  2022      Surgeon: * No surgeons listed *    Procedure: * No procedures listed *    Medications prior to admission:   Prior to Admission medications    Medication Sig Start Date End Date Taking?  Authorizing Provider   Prenatal MV-Min-Fe Fum-FA-DHA (PRENATAL 1 PO) Take 1 tablet by mouth daily     Historical Provider, MD       Current medications:    Current Facility-Administered Medications   Medication Dose Route Frequency Provider Last Rate Last Admin    ropivacaine (NAROPIN) 0.2% injection 0.2%             oxytocin (PITOCIN) 30 units in 500 mL infusion  1 dominick-units/min IntraVENous Continuous Allie Calhoun MD 6 mL/hr at 22 0721 6 dominick-units/min at 22 1346    lactated ringers infusion   IntraVENous Continuous Allie Calhoun  mL/hr at 22 0858 Rate Change at 22 0858    lactated ringers bolus  500 mL IntraVENous PRN Allie Calhoun MD        Or    lactated ringers bolus  1,000 mL IntraVENous PRN Allie Calhoun MD        lidocaine PF 1 % injection 30 mL  30 mL Other PRN Allie Calhoun MD        butorphanol (STADOL) injection 1 mg  1 mg IntraVENous Q1H PRN Allie Calhoun MD   1 mg at 22    nitrous oxide 50% inhalation 1 each  1 each Inhalation Continuous PRN Allie Calhoun MD        ondansetron (ZOFRAN) injection 8 mg  8 mg IntraVENous Q6H PRN Allie Calhoun MD   8 mg at 22    diphenhydrAMINE (BENADRYL) injection 25 mg  25 mg IntraVENous Q4H PRN Allie Calhoun MD        oxytocin (PITOCIN) 30 units in 500 mL infusion  87.3 dominick-units/min IntraVENous PRN Allie Calhoun MD        And    oxytocin (PITOCIN) 10 unit bolus from the bag  10 Units IntraVENous PRN Allie Calhoun MD        methylergonovine (METHERGINE) injection 200 mcg 200 mcg IntraMUSCular PRN Candida Zamora MD        carboprost (HEMABATE) injection 250 mcg  250 mcg IntraMUSCular PRN Candida Zamora MD        miSOPROStol (CYTOTEC) tablet 1,000 mcg  1,000 mcg Rectal PRN Candida Zamora MD        acetaminophen (TYLENOL) tablet 650 mg  650 mg Oral Q4H PRN Candida Zamora MD        ibuprofen (ADVIL;MOTRIN) tablet 800 mg  800 mg Oral Q8H PRN Candida Zamora MD        morphine (PF) injection 2 mg  2 mg IntraVENous Q2H PRN Candida Zamora MD        Or    morphine injection 4 mg  4 mg IntraVENous Q2H PRN Candida Zamora MD        witch hazel-glycerin (TUCKS) pad   Topical PRN Candida Zamora MD        benzocaine-menthol (DERMOPLAST) 20-0.5 % spray   Topical PRN Candida Zamora MD        penicillin G potassium IVPB 3 Million Units  3 Million Units IntraVENous Rebecca Arcos MD   Stopped at 06/21/22 0845       Allergies:  No Known Allergies    Problem List:    Patient Active Problem List   Diagnosis Code    Encounter for elective induction of labor Z34.90       Past Medical History:  History reviewed. No pertinent past medical history. Past Surgical History:  History reviewed. No pertinent surgical history.     Social History:    Social History     Tobacco Use    Smoking status: Never Smoker    Smokeless tobacco: Never Used   Substance Use Topics    Alcohol use: Not Currently                                Counseling given: Not Answered      Vital Signs (Current):   Vitals:    06/21/22 0923 06/21/22 0930 06/21/22 0935 06/21/22 0937   BP:  120/79 115/76 115/63   Pulse:  76 100 73   Resp:       Temp:       TempSrc:       SpO2: 98% 98% 98% 98%   Weight:       Height:                                                  BP Readings from Last 3 Encounters:   06/21/22 115/63   07/17/21 118/82       NPO Status:                                                                                 BMI:   Wt Readings from Last 3 Encounters:   06/20/22 192 lb (87.1 kg)   07/17/21 130 lb (59 kg)     Body mass index is 31.95 kg/m². CBC:   Lab Results   Component Value Date    WBC 7.7 06/20/2022    RBC 3.59 06/20/2022    HGB 10.6 06/20/2022    HCT 32.2 06/20/2022    MCV 89.7 06/20/2022    RDW 13.1 07/17/2021     06/20/2022       CMP:   Lab Results   Component Value Date     07/17/2021    K 3.9 07/17/2021     07/17/2021    CO2 25 07/17/2021    BUN 10 07/17/2021    CREATININE 0.8 07/17/2021    GLUCOSE 97 07/17/2021    PROT 6.7 07/17/2021    BILITOT 0.5 07/17/2021    ALKPHOS 64 07/17/2021    AST 17 07/17/2021    ALT 21 07/17/2021       POC Tests: No results for input(s): POCGLU, POCNA, POCK, POCCL, POCBUN, POCHEMO, POCHCT in the last 72 hours. Coags: No results found for: PROTIME, INR, APTT    HCG (If Applicable):   Lab Results   Component Value Date    PREGSERUM NEGATIVE 07/17/2021        ABGs: No results found for: PHART, PO2ART, INJ5THW, UEB4LWI, BEART, E7UVFQVX     Type & Screen (If Applicable):  Lab Results   Component Value Date    LABRH POS 06/20/2022       Drug/Infectious Status (If Applicable):  Lab Results   Component Value Date    HEPCAB Negative 11/12/2021       COVID-19 Screening (If Applicable): No results found for: COVID19        Anesthesia Evaluation  Patient summary reviewed no history of anesthetic complications:   Airway: Mallampati: II          Dental:          Pulmonary:Negative Pulmonary ROS and normal exam                               Cardiovascular:Negative CV ROS                      Neuro/Psych:   Negative Neuro/Psych ROS              GI/Hepatic/Renal: Neg GI/Hepatic/Renal ROS            Endo/Other: Negative Endo/Other ROS                    Abdominal:             Vascular: negative vascular ROS. Other Findings:           Anesthesia Plan      epidural     ASA 2             Anesthetic plan and risks discussed with patient and spouse. Plan discussed with CRNA.     Attending anesthesiologist reviewed and agrees with Preprocedure content                Andreina Galvin, APRN - CRNA   6/21/2022

## 2022-06-21 NOTE — ANESTHESIA PROCEDURE NOTES
Epidural Block    Patient location during procedure: OB  Start time: 6/21/2022 9:17 AM  End time: 6/21/2022 9:37 AM  Reason for block: labor epidural  Staffing  Performed: resident/CRNA   Anesthesiologist: Alfonso Bella MD  Resident/CRNA: ESSIE Hunter CRNA  Epidural  Patient position: sitting  Prep: ChloraPrep and site prepped and draped  Patient monitoring: cardiac monitor, continuous pulse ox and frequent blood pressure checks  Approach: midline  Location: L3-4  Injection technique: MATT air  Guidance: paresthesia technique  Provider prep: mask and sterile gloves  Needle  Needle type: Tuohy   Needle gauge: 18 G  Needle length: 3.5 in  Needle insertion depth: 6 cm  Catheter type: side hole  Catheter at skin depth: 12 cm  Test dose: negativeCatheter Secured: tegaderm and tape  Assessment  Hemodynamics: stable  Attempts: 2  Outcomes: uncomplicated and patient tolerated procedure well  Additional Notes  First attempt placing catheter the catheter was noted to be at a depth of only about 8 cm after withdrawing the needle. Concerned that the catheter did not have sufficient length remaining inside the epidural space the catheter was removed and again inserted at the same level to an adequate depth. No adverse events.   Preanesthetic Checklist  Completed: patient identified, IV checked, site marked, risks and benefits discussed, surgical/procedural consents, equipment checked, pre-op evaluation, timeout performed, anesthesia consent given, oxygen available, monitors applied/VS acknowledged, fire risk safety assessment completed and verbalized and blood product R/B/A discussed and consented

## 2022-06-21 NOTE — FLOWSHEET NOTE
Dr Arsenio Bustamante updated patient needing an epidural redose, rating pain 4/10, gradually worsening.  States he is en route

## 2022-06-22 PROCEDURE — 6370000000 HC RX 637 (ALT 250 FOR IP): Performed by: ANESTHESIOLOGY

## 2022-06-22 PROCEDURE — 90715 TDAP VACCINE 7 YRS/> IM: CPT | Performed by: OBSTETRICS & GYNECOLOGY

## 2022-06-22 PROCEDURE — 90471 IMMUNIZATION ADMIN: CPT | Performed by: OBSTETRICS & GYNECOLOGY

## 2022-06-22 PROCEDURE — 2580000003 HC RX 258: Performed by: OBSTETRICS & GYNECOLOGY

## 2022-06-22 PROCEDURE — 6360000002 HC RX W HCPCS: Performed by: ANESTHESIOLOGY

## 2022-06-22 PROCEDURE — 6370000000 HC RX 637 (ALT 250 FOR IP): Performed by: OBSTETRICS & GYNECOLOGY

## 2022-06-22 PROCEDURE — 6360000002 HC RX W HCPCS: Performed by: OBSTETRICS & GYNECOLOGY

## 2022-06-22 PROCEDURE — 1200000000 HC SEMI PRIVATE

## 2022-06-22 RX ORDER — FAMOTIDINE 20 MG/1
20 TABLET, FILM COATED ORAL 2 TIMES DAILY
Status: DISCONTINUED | OUTPATIENT
Start: 2022-06-22 | End: 2022-06-24 | Stop reason: HOSPADM

## 2022-06-22 RX ORDER — MISOPROSTOL 200 UG/1
1000 TABLET ORAL PRN
Status: DISCONTINUED | OUTPATIENT
Start: 2022-06-22 | End: 2022-06-24 | Stop reason: HOSPADM

## 2022-06-22 RX ORDER — CARBOPROST TROMETHAMINE 250 UG/ML
250 INJECTION, SOLUTION INTRAMUSCULAR PRN
Status: DISCONTINUED | OUTPATIENT
Start: 2022-06-22 | End: 2022-06-24 | Stop reason: HOSPADM

## 2022-06-22 RX ORDER — KETOROLAC TROMETHAMINE 30 MG/ML
30 INJECTION, SOLUTION INTRAMUSCULAR; INTRAVENOUS EVERY 6 HOURS
Status: COMPLETED | OUTPATIENT
Start: 2022-06-22 | End: 2022-06-22

## 2022-06-22 RX ORDER — ENOXAPARIN SODIUM 100 MG/ML
40 INJECTION SUBCUTANEOUS EVERY 24 HOURS
Status: DISCONTINUED | OUTPATIENT
Start: 2022-06-22 | End: 2022-06-24 | Stop reason: HOSPADM

## 2022-06-22 RX ORDER — OXYCODONE HYDROCHLORIDE 5 MG/1
10 TABLET ORAL EVERY 4 HOURS PRN
Status: DISCONTINUED | OUTPATIENT
Start: 2022-06-22 | End: 2022-06-24 | Stop reason: HOSPADM

## 2022-06-22 RX ORDER — SODIUM CHLORIDE 0.9 % (FLUSH) 0.9 %
5-40 SYRINGE (ML) INJECTION PRN
Status: DISCONTINUED | OUTPATIENT
Start: 2022-06-22 | End: 2022-06-24 | Stop reason: HOSPADM

## 2022-06-22 RX ORDER — SODIUM CHLORIDE 0.9 % (FLUSH) 0.9 %
5-40 SYRINGE (ML) INJECTION EVERY 12 HOURS SCHEDULED
Status: DISCONTINUED | OUTPATIENT
Start: 2022-06-22 | End: 2022-06-24 | Stop reason: HOSPADM

## 2022-06-22 RX ORDER — ONDANSETRON 2 MG/ML
4 INJECTION INTRAMUSCULAR; INTRAVENOUS EVERY 6 HOURS PRN
Status: ACTIVE | OUTPATIENT
Start: 2022-06-22 | End: 2022-06-23

## 2022-06-22 RX ORDER — DOCUSATE SODIUM 100 MG/1
100 CAPSULE, LIQUID FILLED ORAL 2 TIMES DAILY
Status: DISCONTINUED | OUTPATIENT
Start: 2022-06-22 | End: 2022-06-24 | Stop reason: HOSPADM

## 2022-06-22 RX ORDER — NALOXONE HYDROCHLORIDE 0.4 MG/ML
INJECTION, SOLUTION INTRAMUSCULAR; INTRAVENOUS; SUBCUTANEOUS PRN
Status: ACTIVE | OUTPATIENT
Start: 2022-06-22 | End: 2022-06-22

## 2022-06-22 RX ORDER — ONDANSETRON 2 MG/ML
8 INJECTION INTRAMUSCULAR; INTRAVENOUS EVERY 8 HOURS PRN
Status: DISCONTINUED | OUTPATIENT
Start: 2022-06-22 | End: 2022-06-24 | Stop reason: HOSPADM

## 2022-06-22 RX ORDER — OXYCODONE HYDROCHLORIDE 5 MG/1
5 TABLET ORAL EVERY 4 HOURS PRN
Status: ACTIVE | OUTPATIENT
Start: 2022-06-22 | End: 2022-06-22

## 2022-06-22 RX ORDER — FERROUS SULFATE 325(65) MG
325 TABLET ORAL
Status: DISCONTINUED | OUTPATIENT
Start: 2022-06-22 | End: 2022-06-24 | Stop reason: HOSPADM

## 2022-06-22 RX ORDER — SODIUM CHLORIDE 9 MG/ML
INJECTION, SOLUTION INTRAVENOUS PRN
Status: DISCONTINUED | OUTPATIENT
Start: 2022-06-22 | End: 2022-06-24 | Stop reason: HOSPADM

## 2022-06-22 RX ORDER — DIPHENHYDRAMINE HYDROCHLORIDE 50 MG/ML
25 INJECTION INTRAMUSCULAR; INTRAVENOUS EVERY 6 HOURS PRN
Status: ACTIVE | OUTPATIENT
Start: 2022-06-22 | End: 2022-06-23

## 2022-06-22 RX ORDER — BISACODYL 10 MG
10 SUPPOSITORY, RECTAL RECTAL DAILY PRN
Status: DISCONTINUED | OUTPATIENT
Start: 2022-06-22 | End: 2022-06-24 | Stop reason: HOSPADM

## 2022-06-22 RX ORDER — METRONIDAZOLE 500 MG/1
500 TABLET ORAL EVERY 8 HOURS SCHEDULED
Status: COMPLETED | OUTPATIENT
Start: 2022-06-22 | End: 2022-06-23

## 2022-06-22 RX ORDER — SODIUM CHLORIDE, SODIUM LACTATE, POTASSIUM CHLORIDE, CALCIUM CHLORIDE 600; 310; 30; 20 MG/100ML; MG/100ML; MG/100ML; MG/100ML
INJECTION, SOLUTION INTRAVENOUS CONTINUOUS
Status: DISCONTINUED | OUTPATIENT
Start: 2022-06-22 | End: 2022-06-24 | Stop reason: HOSPADM

## 2022-06-22 RX ORDER — KETOROLAC TROMETHAMINE 30 MG/ML
INJECTION, SOLUTION INTRAMUSCULAR; INTRAVENOUS PRN
Status: DISCONTINUED | OUTPATIENT
Start: 2022-06-22 | End: 2022-06-22 | Stop reason: SDUPTHER

## 2022-06-22 RX ORDER — OXYCODONE HYDROCHLORIDE 5 MG/1
5 TABLET ORAL EVERY 4 HOURS PRN
Status: DISCONTINUED | OUTPATIENT
Start: 2022-06-22 | End: 2022-06-24 | Stop reason: HOSPADM

## 2022-06-22 RX ORDER — DIPHENHYDRAMINE HCL 25 MG
25 TABLET ORAL EVERY 6 HOURS PRN
Status: ACTIVE | OUTPATIENT
Start: 2022-06-22 | End: 2022-06-23

## 2022-06-22 RX ORDER — NALOXONE HYDROCHLORIDE 0.4 MG/ML
0.4 INJECTION, SOLUTION INTRAMUSCULAR; INTRAVENOUS; SUBCUTANEOUS PRN
Status: DISCONTINUED | OUTPATIENT
Start: 2022-06-22 | End: 2022-06-24 | Stop reason: HOSPADM

## 2022-06-22 RX ORDER — ACETAMINOPHEN 325 MG/1
650 TABLET ORAL EVERY 6 HOURS
Status: DISPENSED | OUTPATIENT
Start: 2022-06-22 | End: 2022-06-23

## 2022-06-22 RX ORDER — METHYLERGONOVINE MALEATE 0.2 MG/ML
200 INJECTION INTRAVENOUS PRN
Status: DISCONTINUED | OUTPATIENT
Start: 2022-06-22 | End: 2022-06-24 | Stop reason: HOSPADM

## 2022-06-22 RX ORDER — IBUPROFEN 800 MG/1
800 TABLET ORAL EVERY 8 HOURS
Status: DISCONTINUED | OUTPATIENT
Start: 2022-06-22 | End: 2022-06-24 | Stop reason: HOSPADM

## 2022-06-22 RX ORDER — MODIFIED LANOLIN
OINTMENT (GRAM) TOPICAL
Status: DISCONTINUED | OUTPATIENT
Start: 2022-06-22 | End: 2022-06-24 | Stop reason: HOSPADM

## 2022-06-22 RX ORDER — CEPHALEXIN 500 MG/1
500 CAPSULE ORAL EVERY 8 HOURS SCHEDULED
Status: COMPLETED | OUTPATIENT
Start: 2022-06-22 | End: 2022-06-23

## 2022-06-22 RX ORDER — SIMETHICONE 80 MG
80 TABLET,CHEWABLE ORAL EVERY 6 HOURS PRN
Status: DISCONTINUED | OUTPATIENT
Start: 2022-06-22 | End: 2022-06-24 | Stop reason: HOSPADM

## 2022-06-22 RX ADMIN — METRONIDAZOLE 500 MG: 500 TABLET ORAL at 22:30

## 2022-06-22 RX ADMIN — CEPHALEXIN 500 MG: 500 CAPSULE ORAL at 06:00

## 2022-06-22 RX ADMIN — KETOROLAC TROMETHAMINE 30 MG: 30 INJECTION, SOLUTION INTRAMUSCULAR; INTRAVENOUS at 06:00

## 2022-06-22 RX ADMIN — ACETAMINOPHEN 650 MG: 325 TABLET ORAL at 23:46

## 2022-06-22 RX ADMIN — CEPHALEXIN 500 MG: 500 CAPSULE ORAL at 22:30

## 2022-06-22 RX ADMIN — ENOXAPARIN SODIUM 40 MG: 100 INJECTION SUBCUTANEOUS at 12:43

## 2022-06-22 RX ADMIN — IBUPROFEN 800 MG: 800 TABLET, FILM COATED ORAL at 23:46

## 2022-06-22 RX ADMIN — TETANUS TOXOID, REDUCED DIPHTHERIA TOXOID AND ACELLULAR PERTUSSIS VACCINE, ADSORBED 0.5 ML: 5; 2.5; 8; 8; 2.5 SUSPENSION INTRAMUSCULAR at 16:18

## 2022-06-22 RX ADMIN — SODIUM CHLORIDE, POTASSIUM CHLORIDE, SODIUM LACTATE AND CALCIUM CHLORIDE: 600; 310; 30; 20 INJECTION, SOLUTION INTRAVENOUS at 08:29

## 2022-06-22 RX ADMIN — METRONIDAZOLE 500 MG: 500 TABLET ORAL at 14:55

## 2022-06-22 RX ADMIN — DOCUSATE SODIUM 100 MG: 100 CAPSULE, LIQUID FILLED ORAL at 08:06

## 2022-06-22 RX ADMIN — KETOROLAC TROMETHAMINE 30 MG: 30 INJECTION, SOLUTION INTRAMUSCULAR; INTRAVENOUS at 18:23

## 2022-06-22 RX ADMIN — METRONIDAZOLE 500 MG: 500 TABLET ORAL at 06:00

## 2022-06-22 RX ADMIN — OXYCODONE 5 MG: 5 TABLET ORAL at 23:50

## 2022-06-22 RX ADMIN — CEPHALEXIN 500 MG: 500 CAPSULE ORAL at 14:55

## 2022-06-22 RX ADMIN — DOCUSATE SODIUM 100 MG: 100 CAPSULE, LIQUID FILLED ORAL at 19:49

## 2022-06-22 RX ADMIN — Medication 87.3 MILLI-UNITS/MIN: at 00:30

## 2022-06-22 RX ADMIN — KETOROLAC TROMETHAMINE 30 MG: 30 INJECTION, SOLUTION INTRAMUSCULAR; INTRAVENOUS at 12:42

## 2022-06-22 RX ADMIN — KETOROLAC TROMETHAMINE 30 MG: 30 INJECTION, SOLUTION INTRAMUSCULAR; INTRAVENOUS at 00:16

## 2022-06-22 ASSESSMENT — PAIN DESCRIPTION - DESCRIPTORS
DESCRIPTORS: DISCOMFORT
DESCRIPTORS: DISCOMFORT

## 2022-06-22 ASSESSMENT — PAIN SCALES - GENERAL
PAINLEVEL_OUTOF10: 1
PAINLEVEL_OUTOF10: 0
PAINLEVEL_OUTOF10: 5
PAINLEVEL_OUTOF10: 0
PAINLEVEL_OUTOF10: 1

## 2022-06-22 ASSESSMENT — PAIN DESCRIPTION - LOCATION
LOCATION: ABDOMEN;INCISION
LOCATION: ABDOMEN;INCISION

## 2022-06-22 ASSESSMENT — PAIN - FUNCTIONAL ASSESSMENT
PAIN_FUNCTIONAL_ASSESSMENT: ACTIVITIES ARE NOT PREVENTED
PAIN_FUNCTIONAL_ASSESSMENT: ACTIVITIES ARE NOT PREVENTED

## 2022-06-22 NOTE — FLOWSHEET NOTE
Magaly Francois, CRNA on the unit and notified of patient just beginning to use her PCA button, Magaly Francois states she will come in and evaluate patient.

## 2022-06-22 NOTE — FLOWSHEET NOTE
Patient transferred via bed in stable condition with infant in her arms and spouse at their side to 20-23-41-52. Bedside report given to Sohail Mercado, [facility].

## 2022-06-22 NOTE — L&D DELIVERY SUMMARY NOTE
Brief Operative Report      Pre-operative Diagnosis:  Arrest of active phase of labor    Post-operative Diagnosis:  Same    Procedure:  Primary LTC/S    Surgeon: Alexandria Dominguez    Assistant Surgeons: none     Anesthesia:  Epidural anesthesia    Estimated blood loss: 900 cc's     Findings: See Operative Dictation    Complications:  none      See dictated operative report for full details.       Yadi Stone MD

## 2022-06-22 NOTE — PLAN OF CARE
Problem: Pain  Goal: Verbalizes/displays adequate comfort level or baseline comfort level  2022 1517 by Linda Roy RN  Outcome: Progressing  Flowsheets (Taken 2022 0800)  Verbalizes/displays adequate comfort level or baseline comfort level:   Encourage patient to monitor pain and request assistance   Assess pain using appropriate pain scale     Problem: Vaginal Birth or  Section  Goal: Fetal and maternal status remain reassuring during the birth process  Description:  Birth OB-Pregnancy care plan goal which identifies if the fetal and maternal status remain reassuring during the birth process  2022 1517 by Linda Roy RN  Outcome: Progressing  Flowsheets (Taken 2022 2338 by Harjeet Toney RN)  Fetal and Maternal Status Remain Reassuring During the Birth Process:   Monitor vital signs   Monitor uterine activity   Monitor fetal heart rate   Monitor labor progression (Vaginal delivery)     Problem: Infection - Adult  Goal: Absence of fever/infection during anticipated neutropenic period  2022 1517 by Linda Roy RN  Outcome: Progressing  Flowsheets (Taken 2022 0800)  Absence of fever/infection during anticipated neutropenic period: Monitor white blood cell count     Problem: Infection - Adult  Goal: Absence of infection at discharge  2022 1517 by Linda Roy RN  Outcome: Progressing  Flowsheets (Taken 2022 0800)  Absence of infection at discharge:   Assess and monitor for signs and symptoms of infection   Monitor lab/diagnostic results     Problem: Safety - Adult  Goal: Free from fall injury  2022 1517 by Linda Roy RN  Outcome: Progressing  [address] (Taken 2022 1721 by Woodie Goodell, RN)  Free From Fall Injury: Based on caregiver fall risk screen, instruct family/caregiver to ask for assistance with transferring infant if caregiver noted to have fall risk factors     Problem: Discharge Planning  Goal: Discharge to home or other facility with appropriate resources  20227 by Kedar Nguyen RN  Outcome: Progressing  Flowsheets (Taken 2022 0800)  Discharge to home or other facility with appropriate resources: Identify barriers to discharge with patient and caregiver     Problem: Postpartum  Goal: Experiences normal postpartum course  Description:  Postpartum OB-Pregnancy care plan goal which identifies if the mother is experiencing a normal postpartum course  2022 1517 by Kedar Nguyen RN  Outcome: Progressing  Flowsheets (Taken 2022 08)  Experiences Normal Postpartum Course:   Monitor maternal vital signs   Assess uterine involution     Problem: Postpartum  Goal: Appropriate maternal -  bonding  Description:  Postpartum OB-Pregnancy care plan goal which identifies if the mother and  are bonding appropriately  2022 by Kedar Nguyen RN  Outcome: Progressing  Note: Bonding well with infant     Problem: Postpartum  Goal: Establishment of infant feeding pattern  Description:  Postpartum OB-Pregnancy care plan goal which identifies if the mother is establishing a feeding pattern with their   2022 by Kedar Nguyen RN  Outcome: Progressing  Flowsheets (Taken 2022 08)  Establishment of Infant Feeding Pattern:   Assess breast/bottle feeding   Refer to lactation as needed     Problem: Postpartum  Goal: Incisions, wounds, or drain sites healing without S/S of infection  2022 by Kedar Nguyen RN  Outcome: Progressing  Flowsheets (Taken 2022 08)  Incisions, Wounds, or Drain Sites Healing Without Sign and Symptoms of Infection: ADMISSION and DAILY: Assess and document risk factors for pressure ulcer development   Care plan reviewed with patient and SO. Patient and SO  verbalize understanding of the plan of care and contribute to goal setting.

## 2022-06-22 NOTE — OP NOTE
[address] [place] [address]                                OPERATIVE REPORT    PATIENT NAME: Willi Hussein                    :        1995  MED REC NO:   [id number]                           ROOM:       0002  ACCOUNT NO:   [de-identified]                           ADMIT DATE: 2022  PROVIDER:     Maddie Jaramillo M.D.    Emir Fellin2022    PREOPERATIVE DIAGNOSIS:  Arrest of active phase of labor 40 weeks. POSTOPERATIVE DIAGNOSIS:  Arrest of active phase of labor 40 weeks. PROCEDURE:  Primary low-transverse  via Pfannenstiel incision. SURGEON:  Godwin Montalvo MD    ANESTHESIA:  Epidural.    ESTIMATED BLOOD LOSS:  Approximately 900 mL. FINDINGS:  Male infant. Direct OP. Tight nuchal cord x2.  8 pounds 6  ounces. Apgars of 5 at 1 minute, 6 at 5 minutes, 7 at 10 minutes. COMPLICATIONS:  None. DESCRIPTION OF PROCEDURE:  The patient was taken to the operating room,  at which time, she had her epidural dose to a surgical level. Scalpel  was then used to make a Pfannenstiel skin incision. This was carried  down to the underlying tissue of the fascia. The fascia was entered  sharply with Bovie on a cut and the underlying rectus muscle was split  in the midline. Peritoneum was entered. Bladder flap was incised. Bladder blade was placed. A scalpel was then used to make a transverse  incision in the lower uterine segment. This was carried out laterally  digitally. The infant was then brought through the incision. A tight  nuchal cord x2 was then reduced over the head. The anterior shoulder  followed by the posterior shoulder was delivered without complication  followed by the remainder of the infant. Male infant was vigorously  crying on the field. The cord was doubly clamped and cut and the infant  was handed away to the awaiting special care nurse for triage.   Cord  blood was obtained. Cord length was also obtained. Placenta was  manually extracted. The uterus was exteriorized. The incision was  inspected and found to remote from bilateral uterine vessels and was  closed with 0 chromic starting at each angle and tied in the midline. A  second deeper imbricated stitch was then placed for uterine scar  reinforcement. The uterus was then placed back to the pelvis. The  pelvis was irrigated with copious amounts of sterile saline. All  surgical sites were inspected and found to be hemostatic. The bladder  flap was closed with running 3-0 Vicryl. Peritoneum was then closed  with running 3-0 Vicryl. The fascia was then closed with #1 Vicryl  starting at each angle and tied in the midline. The fascia was palpated  and found to be free of any defects. The skin was then irrigated. A  series of 2-0 plain gut was then placed for dead space obliteration and  the skin was then closed in a subcuticular stitch of 4-0 Vicryl on a  Matias needle, had benzoin and Steri-Strips applied as well as sterile  dressing. She was then undraped, cleaned of any Betadine, transported  to the cart, and taken to the recovery room in stable condition.         Chey De Los Santos M.D.    D: 06/22/2022 0:34:48       T: 06/22/2022 2:49:45     MARLY/VIKAS  Job#: [id number]     Doc#: [id number]    CC:

## 2022-06-22 NOTE — FLOWSHEET NOTE
Spoke with the patient regarding the possibility of needing a c section. Patient states she would be in agreement with and is acceptable to VE at 2300.

## 2022-06-22 NOTE — FLOWSHEET NOTE
Spoke with Dr. Ross Milton who called into the unit, updated on very little cervical change made since my arrival, FHTs are reactive, with recurrent variables noted. Patient was just repositioned and recvied a redose. Orders recvied to recheck the patient at [phone] and update physician.

## 2022-06-22 NOTE — FLOWSHEET NOTE
Ramírez care provided to patient, new gown and ramírez pads placed, abd binder applied and ice pack to incision. Patient tolerated well.

## 2022-06-22 NOTE — FLOWSHEET NOTE
Infant has roomed in with mother this shift except for rest break for one feed due to maternal exhaustion. Benefits of rooming in discussed.

## 2022-06-22 NOTE — LACTATION NOTE
Provided and discussed breastfeeding booklet with pt. Encouraged pt. To call lactation for assistance.

## 2022-06-22 NOTE — FLOWSHEET NOTE
Up to BR for second time with assist to void large amount, Pericare instructions given, voices understanding. Instructed patient she may get up on her own now she voiced understanding.

## 2022-06-22 NOTE — PROGRESS NOTES
Department of Obstetrics and Gynecology  Labor and Delivery  Attending Post Partum Progress Note        Subjective: No co's    POD# 0-1:  Delivery      Objective:      VITALS:  BP (!) 108/58   Pulse 63   Temp 98.7 °F (37.1 °C) (Oral)   Resp 16   Ht 5' 5\" (1.651 m)   Wt 192 lb (87.1 kg)   SpO2 99%   Breastfeeding Unknown   BMI 31.95 kg/m²     Vitals:    22 0558   BP: (!) 108/58   Pulse: 63   Resp: 16   Temp: 98.7 °F (37.1 °C)   SpO2: 99%         General:    alert, appears stated age and cooperative   Bowel Sounds:  active           Incision:  healing well, no significant drainage, no dehiscence, no significant erythema         DATA: CBC   Lab Results   Component Value Date    WBC 7.7 2022    HGB 10.6 (L) 2022    HCT 32.2 (L) 2022     2022        Assessment:    Status post  section. Doing well postoperatively. Plan:     Continue current care.       Dena Disla MD

## 2022-06-22 NOTE — FLOWSHEET NOTE
patient states she is having back pain that seems to be getting worse, PCA button given and instructed patient on use of it.

## 2022-06-22 NOTE — PROGRESS NOTES
Cervix 7-8 with caput   No change for 5 hours despite adequate labor  Will proceed with LTC/S for arrest of active labor    Usman Hill MD

## 2022-06-22 NOTE — PLAN OF CARE
Problem: Pain  Goal: Verbalizes/displays adequate comfort level or baseline comfort level  2022 by Hans Dupont RN  Outcome: Progressing  Flowsheets (Taken 2022 by Rei Wilhelm RN)  Verbalizes/displays adequate comfort level or baseline comfort level:   Encourage patient to monitor pain and request assistance   Assess pain using appropriate pain scale     Problem: Vaginal Birth or  Section  Goal: Fetal and maternal status remain reassuring during the birth process  Description:  Birth OB-Pregnancy care plan goal which identifies if the fetal and maternal status remain reassuring during the birth process  2022 by Hans Dupont RN  Outcome: Progressing  Flowsheets (Taken 2022 2338 by Mert White RN)  Fetal and Maternal Status Remain Reassuring During the Birth Process:   Monitor vital signs   Monitor uterine activity   Monitor fetal heart rate   Monitor labor progression (Vaginal delivery)     Problem: Infection - Adult  Goal: Absence of fever/infection during anticipated neutropenic period  2022 by Hans Dupont RN  Outcome: Progressing  Flowsheets (Taken 2022 5228)  Absence of fever/infection during anticipated neutropenic period: Monitor white blood cell count     Problem: Infection - Adult  Goal: Absence of infection at discharge  Outcome: Progressing  Flowsheets (Taken 2022)  Absence of infection at discharge:   Assess and monitor for signs and symptoms of infection   Monitor lab/diagnostic results   Monitor all insertion sites i.e., indwelling lines, tubes and drains   Administer medications as ordered   Instruct and encourage patient and family to use good hand hygiene technique     Problem: Safety - Adult  Goal: Free from fall injury  2022 by Hans Dupont RN  Outcome: Progressing  Flowsheets (Taken 2022 by Rei Wilhelm RN)  Free From Fall Injury: Based on caregiver fall risk screen, instruct family/caregiver to ask for assistance with transferring infant if caregiver noted to have fall risk factors     Problem: Discharge Planning  Goal: Discharge to home or other facility with appropriate resources  2022 0525 by Radha Gomez, RN  Outcome: Tom Rom (Taken 2022 2338 by Mallika Pearson, RN)  Discharge to home or other facility with appropriate resources:   Identify barriers to discharge with patient and caregiver   Arrange for needed discharge resources and transportation as appropriate   Identify discharge learning needs (meds, wound care, etc)     Problem: Postpartum  Goal: Experiences normal postpartum course  Description:  Postpartum OB-Pregnancy care plan goal which identifies if the mother is experiencing a normal postpartum course  Outcome: Progressing  Flowsheets (Taken 2022)  Experiences Normal Postpartum Course:   Monitor maternal vital signs   Assess uterine involution     Problem: Postpartum  Goal: Appropriate maternal -  bonding  Description:  Postpartum OB-Pregnancy care plan goal which identifies if the mother and  are bonding appropriately  Outcome: Progressing  Note: Bonding with baby, participating in infant care.        Problem: Postpartum  Goal: Incisions, wounds, or drain sites healing without S/S of infection  Outcome: Progressing  Flowsheets (Taken 2022)  Incisions, Wounds, or Drain Sites Healing Without Sign and Symptoms of Infection:   ADMISSION and DAILY: Assess and document risk factors for pressure ulcer development   TWICE DAILY: Assess and document skin integrity   TWICE DAILY: Assess and document dressing/incision, wound bed, drain sites and surrounding tissue   Implement wound care per orders     Problem: Postpartum  Goal: Establishment of infant feeding pattern  Description:  Postpartum OB-Pregnancy care plan goal which identifies if the mother is establishing a feeding pattern with their   Outcome: Progressing  Flowsheets (Taken 6/22/2022 4908)  Establishment of Infant Feeding Pattern:   Assess breast/bottle feeding   Refer to lactation as needed   Care plan reviewed with patient and she contributes to goal setting and voices understanding of plan of care.

## 2022-06-23 LAB
HCT VFR BLD CALC: 23.5 % (ref 37–47)
HEMOGLOBIN: 7.4 GM/DL (ref 12–16)

## 2022-06-23 PROCEDURE — 85018 HEMOGLOBIN: CPT

## 2022-06-23 PROCEDURE — 6360000002 HC RX W HCPCS: Performed by: OBSTETRICS & GYNECOLOGY

## 2022-06-23 PROCEDURE — 36415 COLL VENOUS BLD VENIPUNCTURE: CPT

## 2022-06-23 PROCEDURE — 6370000000 HC RX 637 (ALT 250 FOR IP): Performed by: OBSTETRICS & GYNECOLOGY

## 2022-06-23 PROCEDURE — 85014 HEMATOCRIT: CPT

## 2022-06-23 PROCEDURE — 1200000000 HC SEMI PRIVATE

## 2022-06-23 RX ORDER — ACETAMINOPHEN 500 MG
1000 TABLET ORAL EVERY 8 HOURS PRN
Status: DISCONTINUED | OUTPATIENT
Start: 2022-06-23 | End: 2022-06-24 | Stop reason: HOSPADM

## 2022-06-23 RX ORDER — KETOROLAC TROMETHAMINE 10 MG/1
10 TABLET, FILM COATED ORAL EVERY 6 HOURS PRN
Qty: 20 TABLET | Refills: 1 | Status: SHIPPED | OUTPATIENT
Start: 2022-06-23 | End: 2023-06-23

## 2022-06-23 RX ORDER — OXYCODONE HYDROCHLORIDE AND ACETAMINOPHEN 5; 325 MG/1; MG/1
1 TABLET ORAL EVERY 6 HOURS PRN
Qty: 28 TABLET | Refills: 0 | Status: SHIPPED | OUTPATIENT
Start: 2022-06-23 | End: 2022-06-30

## 2022-06-23 RX ADMIN — OXYCODONE 5 MG: 5 TABLET ORAL at 03:53

## 2022-06-23 RX ADMIN — CEPHALEXIN 500 MG: 500 CAPSULE ORAL at 16:10

## 2022-06-23 RX ADMIN — DOCUSATE SODIUM 100 MG: 100 CAPSULE, LIQUID FILLED ORAL at 21:18

## 2022-06-23 RX ADMIN — CEPHALEXIN 500 MG: 500 CAPSULE ORAL at 23:59

## 2022-06-23 RX ADMIN — METRONIDAZOLE 500 MG: 500 TABLET ORAL at 16:10

## 2022-06-23 RX ADMIN — IBUPROFEN 800 MG: 800 TABLET, FILM COATED ORAL at 08:10

## 2022-06-23 RX ADMIN — CEPHALEXIN 500 MG: 500 CAPSULE ORAL at 07:57

## 2022-06-23 RX ADMIN — ACETAMINOPHEN 1000 MG: 500 TABLET ORAL at 13:31

## 2022-06-23 RX ADMIN — METRONIDAZOLE 500 MG: 500 TABLET ORAL at 07:57

## 2022-06-23 RX ADMIN — OXYCODONE 5 MG: 5 TABLET ORAL at 23:57

## 2022-06-23 RX ADMIN — ENOXAPARIN SODIUM 40 MG: 100 INJECTION SUBCUTANEOUS at 13:18

## 2022-06-23 RX ADMIN — DOCUSATE SODIUM 100 MG: 100 CAPSULE, LIQUID FILLED ORAL at 08:10

## 2022-06-23 RX ADMIN — METRONIDAZOLE 500 MG: 500 TABLET ORAL at 23:59

## 2022-06-23 RX ADMIN — IBUPROFEN 800 MG: 800 TABLET, FILM COATED ORAL at 23:57

## 2022-06-23 RX ADMIN — FERROUS SULFATE TAB 325 MG (65 MG ELEMENTAL FE) 325 MG: 325 (65 FE) TAB at 08:10

## 2022-06-23 RX ADMIN — IBUPROFEN 800 MG: 800 TABLET, FILM COATED ORAL at 16:10

## 2022-06-23 RX ADMIN — ACETAMINOPHEN 1000 MG: 500 TABLET ORAL at 21:18

## 2022-06-23 ASSESSMENT — PAIN DESCRIPTION - LOCATION
LOCATION: ABDOMEN;INCISION
LOCATION: INCISION
LOCATION: ABDOMEN;INCISION
LOCATION: INCISION
LOCATION: ABDOMEN;INCISION

## 2022-06-23 ASSESSMENT — PAIN SCALES - GENERAL
PAINLEVEL_OUTOF10: 1
PAINLEVEL_OUTOF10: 2
PAINLEVEL_OUTOF10: 2
PAINLEVEL_OUTOF10: 1
PAINLEVEL_OUTOF10: 1
PAINLEVEL_OUTOF10: 4

## 2022-06-23 ASSESSMENT — PAIN - FUNCTIONAL ASSESSMENT
PAIN_FUNCTIONAL_ASSESSMENT: ACTIVITIES ARE NOT PREVENTED

## 2022-06-23 ASSESSMENT — PAIN DESCRIPTION - DESCRIPTORS
DESCRIPTORS: SORE
DESCRIPTORS: DISCOMFORT
DESCRIPTORS: DISCOMFORT
DESCRIPTORS: SORE
DESCRIPTORS: DISCOMFORT

## 2022-06-23 ASSESSMENT — PAIN DESCRIPTION - ORIENTATION
ORIENTATION: LOWER

## 2022-06-23 NOTE — ANESTHESIA POSTPROCEDURE EVALUATION
Department of Anesthesiology  Postprocedure Note    Patient: Enrique Garcia  MRN: [id number]  YOB: 1995  Date of evaluation: 2022      Procedure Summary     Date: 22 Room / Location: [address] [place] [address]    Anesthesia Start: 9841 Anesthesia Stop: 22 0028    Procedures:        SECTION (N/A Uterus)      Labor Analgesia Diagnosis: (Failure to Progress)    Surgeons: Radha Michael MD Responsible Provider: Néstor Wright MD    Anesthesia Type: epidural ASA Status: 2          Anesthesia Type: No value filed.     Brent Phase I: Brent Score: 9    Brent Phase II:        Anesthesia Post Evaluation

## 2022-06-23 NOTE — PLAN OF CARE
Problem: Pain  Goal: Verbalizes/displays adequate comfort level or baseline comfort level  Outcome: Progressing  Flowsheets (Taken 2022)  Verbalizes/displays adequate comfort level or baseline comfort level:   Encourage patient to monitor pain and request assistance   Assess pain using appropriate pain scale     Problem: Infection - Adult  Goal: Absence of fever/infection during anticipated neutropenic period  Recent Flowsheet Documentation  Taken 2022 by Karen Reed RN  Absence of fever/infection during anticipated neutropenic period: Monitor white blood cell count     Problem: Infection - Adult  Goal: Absence of infection at discharge  Outcome: Progressing  Flowsheets (Taken 2022)  Absence of infection at discharge:   Assess and monitor for signs and symptoms of infection   Monitor lab/diagnostic results     Problem: Safety - Adult  Goal: Free from fall injury  Outcome: Progressing  Flowsheets (Taken 2022)  Free From Fall Injury: Instruct family/caregiver on patient safety     Problem: Discharge Planning  Goal: Discharge to home or other facility with appropriate resources  Outcome: Progressing  Flowsheets (Taken 2022)  Discharge to home or other facility with appropriate resources: Identify barriers to discharge with patient and caregiver     Problem: Postpartum  Goal: Experiences normal postpartum course  Description:  Postpartum OB-Pregnancy care plan goal which identifies if the mother is experiencing a normal postpartum course  Outcome: Progressing  Flowsheets  Taken 2022 1533  Experiences Normal Postpartum Course:   Assess uterine involution   Monitor maternal vital signs  Taken 2022 0810  Experiences Normal Postpartum Course:   Monitor maternal vital signs   Assess uterine involution     Problem: Postpartum  Goal: Appropriate maternal -  bonding  Description:  Postpartum OB-Pregnancy care plan goal which identifies if the mother and  are bonding appropriately  Outcome: Progressing  Note: Bonding well with infant     Problem: Postpartum  Goal: Establishment of infant feeding pattern  Description:  Postpartum OB-Pregnancy care plan goal which identifies if the mother is establishing a feeding pattern with their   Outcome: Progressing  Flowsheets (Taken 2022 0810)  Establishment of Infant Feeding Pattern:   Assess breast/bottle feeding   Refer to lactation as needed     Problem: Postpartum  Goal: Incisions, wounds, or drain sites healing without S/S of infection  Outcome: Progressing  Flowsheets (Taken 2022 0810)  Incisions, Wounds, or Drain Sites Healing Without Sign and Symptoms of Infection: ADMISSION and DAILY: Assess and document risk factors for pressure ulcer development   Care plan reviewed with patient. Patient  verbalizes understanding of the plan of care and contribute to goal setting.

## 2022-06-23 NOTE — PLAN OF CARE
Problem: Pain  Goal: Verbalizes/displays adequate comfort level or baseline comfort level  [phone] by Sasha Montes RN  Outcome: Progressing  Flowsheets (Taken 2022)  Verbalizes/displays adequate comfort level or baseline comfort level:   Encourage patient to monitor pain and request assistance   Assess pain using appropriate pain scale   Administer analgesics based on type and severity of pain and evaluate response   Implement non-pharmacological measures as appropriate and evaluate response     Problem: Infection - Adult  Goal: Absence of fever/infection during anticipated neutropenic period  2/15/8707 9809 by Sasha Montes RN  Outcome: Progressing     Problem: Infection - Adult  Goal: Absence of infection at discharge  [phone] by Sasha Montes RN  Outcome: Progressing  Flowsheets (Taken 2022)  Absence of infection at discharge: Assess and monitor for signs and symptoms of infection     Problem: Safety - Adult  Goal: Free from fall injury   4558 by Sasha Montes RN  Outcome: Progressing  Flowsheets (Taken 2022)  Free From Fall Injury: Instruct family/caregiver on patient safety     Problem: Discharge Planning  Goal: Discharge to home or other facility with appropriate resources   7216 by Sasha Montes RN  Outcome: Progressing  Flowsheets (Taken 2022)  Discharge to home or other facility with appropriate resources: Identify barriers to discharge with patient and caregiver     Problem: Postpartum  Goal: Experiences normal postpartum course  Description:  Postpartum OB-Pregnancy care plan goal which identifies if the mother is experiencing a normal postpartum course   0676 by Sasha Montes RN  Outcome: Progressing  Flowsheets (Taken 2022)  Experiences Normal Postpartum Course:   Monitor maternal vital signs   Assess uterine involution     Problem: Postpartum  Goal: Appropriate maternal -  bonding  Description: Postpartum OB-Pregnancy care plan goal which identifies if the mother and  are bonding appropriately   3556 by Lang Ackerman RN  Outcome: Progressing  Note: Bonding with baby, participating in infant care. Problem: Postpartum  Goal: Establishment of infant feeding pattern  Description:  Postpartum OB-Pregnancy care plan goal which identifies if the mother is establishing a feeding pattern with their   [phone] by Lang Ackerman RN  Outcome: Progressing  Flowsheets (Taken 2022)  Establishment of Infant Feeding Pattern:   Assess breast/bottle feeding   Refer to lactation as needed     Problem: Postpartum  Goal: Incisions, wounds, or drain sites healing without S/S of infection   2899 by Lang Ackerman RN  Outcome: Progressing  Flowsheets (Taken 2022)  Incisions, Wounds, or Drain Sites Healing Without Sign and Symptoms of Infection: TWICE DAILY: Assess and document skin integrity    Plan of care discussed with mother and she contributes to goal setting and voices understanding of plan of care.

## 2022-06-23 NOTE — PROGRESS NOTES
Department of Obstetrics and Gynecology  Labor and Delivery  Attending Post Partum Progress Note        Subjective: no co's    POD# 1:  Delivery      Objective:      VITALS:  /68   Pulse 95   Temp 98.2 °F (36.8 °C) (Oral)   Resp 16   Ht 5' 5\" (1.651 m)   Wt 192 lb (87.1 kg)   SpO2 97%   Breastfeeding Unknown   BMI 31.95 kg/m²     Vitals:    22 0810   BP: 118/68   Pulse: 95   Resp: 16   Temp: 98.2 °F (36.8 °C)   SpO2: 97%         General:    alert, appears stated age and cooperative   Bowel Sounds:  active           Incision:  healing well, no significant drainage, no dehiscence, no significant erythema         DATA: CBC   Lab Results   Component Value Date    WBC 7.7 2022    HGB 7.4 (L) 2022    HCT 23.5 (L) 2022     2022        Assessment:    Status post  section. Doing well postoperatively. Plan:     Continue current care.       Ginger Meyers MD

## 2022-06-23 NOTE — DISCHARGE SUMMARY
C/Section Discharge Summary    Gestational Age:40w0d    Antepartum complications: none    Type of Delivery: See dictated operative note    Labs: CBC   Lab Results   Component Value Date    WBC 7.7 06/20/2022    HGB 7.4 (L) 06/23/2022    HCT 23.5 (L) 06/23/2022     06/20/2022        Intrapartum complications: None    Postpartum complications: none    The patient is ambulating well. The patient is tolerating a normal diet.     Discharge Medication:      Medication List      ASK your doctor about these medications    PRENATAL 1 PO             Condition: Stable    Discharge Date: 06/24 or 06/25    Plan:   Follow up in 5 day(s)    Kirsty Guillen MD

## 2022-06-24 VITALS
HEART RATE: 103 BPM | BODY MASS INDEX: 31.99 KG/M2 | DIASTOLIC BLOOD PRESSURE: 73 MMHG | WEIGHT: 192 LBS | SYSTOLIC BLOOD PRESSURE: 115 MMHG | TEMPERATURE: 98.3 F | HEIGHT: 65 IN | OXYGEN SATURATION: 98 % | RESPIRATION RATE: 17 BRPM

## 2022-06-24 LAB
ERYTHROCYTE [DISTWIDTH] IN BLOOD BY AUTOMATED COUNT: 15 % (ref 11.5–14.5)
ERYTHROCYTE [DISTWIDTH] IN BLOOD BY AUTOMATED COUNT: 50.2 FL (ref 35–45)
HCT VFR BLD CALC: 26.1 % (ref 37–47)
HEMOGLOBIN: 8.4 GM/DL (ref 12–16)
MCH RBC QN AUTO: 29.7 PG (ref 26–33)
MCHC RBC AUTO-ENTMCNC: 32.2 GM/DL (ref 32.2–35.5)
MCV RBC AUTO: 92.2 FL (ref 81–99)
PLATELET # BLD: 201 THOU/MM3 (ref 130–400)
PMV BLD AUTO: 11.4 FL (ref 9.4–12.4)
RBC # BLD: 2.83 MILL/MM3 (ref 4.2–5.4)
WBC # BLD: 7.1 THOU/MM3 (ref 4.8–10.8)

## 2022-06-24 PROCEDURE — 6360000002 HC RX W HCPCS: Performed by: OBSTETRICS & GYNECOLOGY

## 2022-06-24 PROCEDURE — 85027 COMPLETE CBC AUTOMATED: CPT

## 2022-06-24 PROCEDURE — 36415 COLL VENOUS BLD VENIPUNCTURE: CPT

## 2022-06-24 PROCEDURE — 6370000000 HC RX 637 (ALT 250 FOR IP): Performed by: OBSTETRICS & GYNECOLOGY

## 2022-06-24 RX ADMIN — ACETAMINOPHEN 1000 MG: 500 TABLET ORAL at 13:38

## 2022-06-24 RX ADMIN — ACETAMINOPHEN 1000 MG: 500 TABLET ORAL at 06:17

## 2022-06-24 RX ADMIN — DOCUSATE SODIUM 100 MG: 100 CAPSULE, LIQUID FILLED ORAL at 08:13

## 2022-06-24 RX ADMIN — IBUPROFEN 800 MG: 800 TABLET, FILM COATED ORAL at 08:13

## 2022-06-24 RX ADMIN — ENOXAPARIN SODIUM 40 MG: 100 INJECTION SUBCUTANEOUS at 12:46

## 2022-06-24 RX ADMIN — OXYCODONE 5 MG: 5 TABLET ORAL at 13:39

## 2022-06-24 RX ADMIN — FERROUS SULFATE TAB 325 MG (65 MG ELEMENTAL FE) 325 MG: 325 (65 FE) TAB at 08:14

## 2022-06-24 ASSESSMENT — PAIN SCALES - GENERAL
PAINLEVEL_OUTOF10: 5
PAINLEVEL_OUTOF10: 1

## 2022-06-24 ASSESSMENT — PAIN - FUNCTIONAL ASSESSMENT
PAIN_FUNCTIONAL_ASSESSMENT: ACTIVITIES ARE NOT PREVENTED

## 2022-06-24 ASSESSMENT — PAIN DESCRIPTION - DESCRIPTORS
DESCRIPTORS: SORE
DESCRIPTORS: ACHING
DESCRIPTORS: ACHING;DISCOMFORT

## 2022-06-24 ASSESSMENT — PAIN DESCRIPTION - LOCATION
LOCATION: INCISION
LOCATION: ABDOMEN;INCISION
LOCATION: INCISION

## 2022-06-24 ASSESSMENT — PAIN DESCRIPTION - ORIENTATION: ORIENTATION: LOWER

## 2022-06-24 NOTE — PLAN OF CARE
Problem: Pain  Goal: Verbalizes/displays adequate comfort level or baseline comfort level  Outcome: Adequate for Discharge  Flowsheets (Taken 2022 by Meg Rankin RN)  Verbalizes/displays adequate comfort level or baseline comfort level:   Encourage patient to monitor pain and request assistance   Administer analgesics based on type and severity of pain and evaluate response   Assess pain using appropriate pain scale   Implement non-pharmacological measures as appropriate and evaluate response  Note: Pain controlled with po meds. Discussed ice for perineal pain and/or incisional pain or the use of warm blanket/heating pad for uterine cramps. Pt states her pain goal 3/10 has been met. Problem: Infection - Adult  Goal: Absence of infection at discharge  Outcome: Adequate for Discharge  Flowsheets (Taken 2022 by Meg Rankin RN)  Absence of infection at discharge:   Assess and monitor for signs and symptoms of infection   Monitor lab/diagnostic results  Note: Vital signs and assessments WNL. Problem: Safety - Adult  Goal: Free from fall injury  Outcome: Adequate for Discharge  Note: No falls     Problem: Discharge Planning  Goal: Discharge to home or other facility with appropriate resources  Outcome: Adequate for Discharge  Flowsheets (Taken 2022 by Meg Rankin RN)  Discharge to home or other facility with appropriate resources: Identify barriers to discharge with patient and caregiver  Note: Home today     Problem: Postpartum  Goal: Experiences normal postpartum course  Description:  Postpartum OB-Pregnancy care plan goal which identifies if the mother is experiencing a normal postpartum course  Outcome: Adequate for Discharge  Note: Vital signs and assessments WNL.     Goal: Appropriate maternal -  bonding  Description:  Postpartum OB-Pregnancy care plan goal which identifies if the mother and  are bonding appropriately  Outcome: Adequate for Discharge  Note: Bonding with baby, participating in infant care. Goal: Establishment of infant feeding pattern  Description:  Postpartum OB-Pregnancy care plan goal which identifies if the mother is establishing a feeding pattern with their   Outcome: Adequate for Discharge  Note: Infant nursing and mother pumping. Goal: Incisions, wounds, or drain sites healing without S/S of infection  Outcome: Adequate for Discharge  Flowsheets (Taken 2022 by Meg Rankin RN)  Incisions, Wounds, or Drain Sites Healing Without Sign and Symptoms of Infection: ADMISSION and DAILY: Assess and document risk factors for pressure ulcer development  Note: Vital signs and assessments WNL. Care plan reviewed with patient and she contributes to goal setting and voices understanding of plan of care.

## 2022-06-24 NOTE — PLAN OF CARE
Problem: Pain  Goal: Verbalizes/displays adequate comfort level or baseline comfort level  2022 by Cayrl So RN  Outcome: Progressing  Flowsheets (Taken 2022)  Verbalizes/displays adequate comfort level or baseline comfort level:   Encourage patient to monitor pain and request assistance   Administer analgesics based on type and severity of pain and evaluate response   Assess pain using appropriate pain scale   Implement non-pharmacological measures as appropriate and evaluate response     Problem: Infection - Adult  Goal: Absence of infection at discharge  2022 by Caryl So RN  Outcome: Progressing  Flowsheets (Taken 2022)  Absence of infection at discharge:   Assess and monitor for signs and symptoms of infection   Monitor lab/diagnostic results     Problem: Safety - Adult  Goal: Free from fall injury  2022 by Caryl So RN  Outcome: Augustus Brochure (Taken 2022)  Free From Fall Injury: Instruct family/caregiver on patient safety     Problem: Discharge Planning  Goal: Discharge to home or other facility with appropriate resources  2022 by Caryl So RN  Outcome: Progressing  Flowsheets (Taken 2022)  Discharge to home or other facility with appropriate resources: Identify barriers to discharge with patient and caregiver     Problem: Postpartum  Goal: Experiences normal postpartum course  Description:  Postpartum OB-Pregnancy care plan goal which identifies if the mother is experiencing a normal postpartum course  2022 by Caryl So RN  Outcome: Progressing  Flowsheets (Taken 2022)  Experiences Normal Postpartum Course: Monitor maternal vital signs     Problem: Postpartum  Goal: Appropriate maternal -  bonding  Description:  Postpartum OB-Pregnancy care plan goal which identifies if the mother and  are bonding appropriately  2022 by Caryl So RN  Outcome: Progressing  Note: Mother bonding well with        Problem: Postpartum  Goal: Establishment of infant feeding pattern  Description:  Postpartum OB-Pregnancy care plan goal which identifies if the mother is establishing a feeding pattern with their   2022 by Toby Castellanos RN  Outcome: Progressing  Flowsheets (Taken 2022)  Establishment of Infant Feeding Pattern:   Assess breast/bottle feeding   Refer to lactation as needed     Problem: Postpartum  Goal: Incisions, wounds, or drain sites healing without S/S of infection  2022 by Toby Castellanos RN  Outcome: Progressing  [address] (Taken 2022)  Incisions, Wounds, or Drain Sites Healing Without Sign and Symptoms of Infection: ADMISSION and DAILY: Assess and document risk factors for pressure ulcer development     Problem: Infection - Adult  Goal: Absence of fever/infection during anticipated neutropenic period  Recent Flowsheet Documentation  Taken 2022 by Toby Castellanos RN  Absence of fever/infection during anticipated neutropenic period: Monitor white blood cell count  Plan of care discussed with mother and she contributes to goal setting and voices understanding of plan of care.

## 2022-06-24 NOTE — PLAN OF CARE
Problem: Pain  Goal: Verbalizes/displays adequate comfort level or baseline comfort level  6/24/2022 1344 by Aleksey Cuevas RN  Outcome: Completed  6/24/2022 1342 by Aleksey Cuevas RN  Outcome: Adequate for Discharge  Flowsheets (Taken 6/23/2022 2118 by Roxann Collazo RN)  Verbalizes/displays adequate comfort level or baseline comfort level:   Encourage patient to monitor pain and request assistance   Administer analgesics based on type and severity of pain and evaluate response   Assess pain using appropriate pain scale   Implement non-pharmacological measures as appropriate and evaluate response  Note: Pain controlled with po meds. Discussed ice for perineal pain and/or incisional pain or the use of warm blanket/heating pad for uterine cramps. Pt states her pain goal 3/10 has been met. Problem: Infection - Adult  Goal: Absence of infection at discharge  6/24/2022 1344 by Aleksey Cuevas RN  Outcome: Completed  6/24/2022 1342 by Aleksey Cuevas RN  Outcome: Adequate for Discharge  Flowsheets (Taken 6/23/2022 2118 by Roxann Collazo RN)  Absence of infection at discharge:   Assess and monitor for signs and symptoms of infection   Monitor lab/diagnostic results  Note: Vital signs and assessments WNL.        Problem: Safety - Adult  Goal: Free from fall injury  6/24/2022 1344 by Aleksey Cuevas RN  Outcome: Completed  6/24/2022 1342 by Aleksey Cuevas RN  Outcome: Adequate for Discharge  Note: No falls     Problem: Discharge Planning  Goal: Discharge to home or other facility with appropriate resources  6/24/2022 1344 by Aleksey Cuevas RN  Outcome: Completed  6/24/2022 1342 by Aleksey Cuevas RN  Outcome: Adequate for Discharge  Flowsheets (Taken 6/23/2022 2118 by Roxann Collazo RN)  Discharge to home or other facility with appropriate resources: Identify barriers to discharge with patient and caregiver  Note: Home today     Problem: Postpartum  Goal: Experiences normal postpartum course  Description: Postpartum OB-Pregnancy care plan goal which identifies if the mother is experiencing a normal postpartum course  2022 1344 by Aleksey Cuevas RN  Outcome: Completed  2022 134 by Aleksey Cuevas RN  Outcome: Adequate for Discharge  Note: Vital signs and assessments WNL. Goal: Appropriate maternal -  bonding  Description:  Postpartum OB-Pregnancy care plan goal which identifies if the mother and  are bonding appropriately  2022 1344 by Aleksey Cuevas RN  Outcome: Completed  2022 134 by Aleksey Cuevas RN  Outcome: Adequate for Discharge  Note: Bonding with baby, participating in infant care. Goal: Establishment of infant feeding pattern  Description:  Postpartum OB-Pregnancy care plan goal which identifies if the mother is establishing a feeding pattern with their   2022 1344 by Aleksey Cuevas RN  Outcome: Completed  2022 134 by Aleksey Cuevas RN  Outcome: Adequate for Discharge  Note: Infant nursing and mother pumping. Goal: Incisions, wounds, or drain sites healing without S/S of infection  2022 1344 by Aleksey Cuevas RN  Outcome: Completed  2022 134 by Aleksey Cuevas RN  Outcome: Adequate for Discharge  Flowsheets (Taken 2022 by Roxann Collazo RN)  Incisions, Wounds, or Drain Sites Healing Without Sign and Symptoms of Infection: ADMISSION and DAILY: Assess and document risk factors for pressure ulcer development  Note: Vital signs and assessments WNL.

## 2022-06-24 NOTE — FLOWSHEET NOTE
Pt moved out of room 5A14 due to plumbing issues and moved to room 5A16. All belongings brought to new room.

## 2022-06-27 NOTE — PROGRESS NOTES
CLINICAL PHARMACY NOTE: MEDS TO BEDS    Total # of Prescriptions Filled: 2   The following medications were delivered to the patient:  Ketorolac 10mg  Percocet 5-325mg    Additional Documentation:

## 2023-10-09 ENCOUNTER — NURSE ONLY (OUTPATIENT)
Dept: LAB | Age: 28
End: 2023-10-09

## 2023-10-13 LAB
C TRACH RRNA SPEC QL NAA+PROBE: NEGATIVE
N GONORRHOEA RRNA SPEC QL NAA+PROBE: NEGATIVE
SPEC CONTAINER SPEC: NORMAL
SPECIMEN SOURCE: NORMAL
SPECIMEN SOURCE: NORMAL
T VAGINALIS RRNA SPEC QL NAA+PROBE: NEGATIVE

## 2024-05-05 ENCOUNTER — HOSPITAL ENCOUNTER (OUTPATIENT)
Age: 29
Discharge: HOME | End: 2024-05-05
Payer: COMMERCIAL

## 2024-05-05 LAB
ABO: NORMAL
ANTIBODY SCREEN: NORMAL
BASOPHILS ABSOLUTE: 0 THOU/MM3 (ref 0–0.1)
BASOPHILS NFR BLD AUTO: 0.3 %
DEPRECATED RDW RBC AUTO: 51 FL (ref 35–45)
EOSINOPHIL NFR BLD AUTO: 1.2 %
EOSINOPHILS ABSOLUTE: 0.1 THOU/MM3 (ref 0–0.4)
ERYTHROCYTE [DISTWIDTH] IN BLOOD BY AUTOMATED COUNT: 15.2 % (ref 11.5–14.5)
HCT VFR BLD AUTO: 35.6 % (ref 37–47)
HGB BLD-MCNC: 11.6 GM/DL (ref 12–16)
IMM GRANULOCYTES # BLD AUTO: 0.03 THOU/MM3 (ref 0–0.07)
IMM GRANULOCYTES NFR BLD AUTO: 0.4 %
LYMPHOCYTES ABSOLUTE: 1.3 THOU/MM3 (ref 1–4.8)
LYMPHOCYTES NFR BLD AUTO: 19.7 %
MCH RBC QN AUTO: 30.1 PG (ref 26–33)
MCHC RBC AUTO-ENTMCNC: 32.6 GM/DL (ref 32.2–35.5)
MCV RBC AUTO: 92.2 FL (ref 81–99)
MONOCYTES ABSOLUTE: 0.5 THOU/MM3 (ref 0.4–1.3)
MONOCYTES NFR BLD AUTO: 7.2 %
NEUTROPHILS ABSOLUTE: 4.8 THOU/MM3 (ref 1.8–7.7)
NEUTROPHILS NFR BLD AUTO: 71.2 %
NRBC BLD AUTO-RTO: 0 /100 WBC
PLATELET # BLD AUTO: 213 THOU/MM3 (ref 130–400)
PMV BLD AUTO: 12 FL (ref 9.4–12.4)
RBC # BLD AUTO: 3.86 MILL/MM3 (ref 4.2–5.4)
RH FACTOR: NORMAL
WBC # BLD AUTO: 6.7 THOU/MM3 (ref 4.8–10.8)

## 2024-05-05 PROCEDURE — 85025 COMPLETE CBC W/AUTO DIFF WBC: CPT

## 2024-05-05 PROCEDURE — 86850 RBC ANTIBODY SCREEN: CPT

## 2024-05-05 PROCEDURE — 86901 BLOOD TYPING SEROLOGIC RH(D): CPT

## 2024-05-05 PROCEDURE — 86900 BLOOD TYPING SEROLOGIC ABO: CPT

## 2024-05-05 PROCEDURE — 86592 SYPHILIS TEST NON-TREP QUAL: CPT

## 2024-05-05 PROCEDURE — 36415 COLL VENOUS BLD VENIPUNCTURE: CPT

## 2024-05-06 LAB — RPR SER QL: NONREACTIVE

## 2024-05-07 ENCOUNTER — HOSPITAL ENCOUNTER (INPATIENT)
Age: 29
LOS: 2 days | Discharge: HOME | End: 2024-05-09
Attending: OBSTETRICS & GYNECOLOGY | Admitting: OBSTETRICS & GYNECOLOGY
Payer: COMMERCIAL

## 2024-05-07 ENCOUNTER — ANESTHESIA EVENT (OUTPATIENT)
Dept: LABOR AND DELIVERY | Age: 29
End: 2024-05-07
Payer: COMMERCIAL

## 2024-05-07 ENCOUNTER — ANESTHESIA (OUTPATIENT)
Dept: LABOR AND DELIVERY | Age: 29
End: 2024-05-07
Payer: COMMERCIAL

## 2024-05-07 PROBLEM — O34.211 ENCOUNTER FOR MATERNAL CARE FOR LOW TRANSVERSE SCAR FROM REPEAT CESAREAN DELIVERY: Status: ACTIVE | Noted: 2024-05-07

## 2024-05-07 LAB
AMPHETAMINES UR QL SCN: NEGATIVE
BARBITURATES UR QL SCN: NEGATIVE
BENZODIAZ UR QL SCN: NEGATIVE
BZE UR QL SCN: NEGATIVE
CANNABINOIDS UR QL SCN: NEGATIVE
FENTANYL: NEGATIVE
OPIATES UR QL SCN: NEGATIVE
OXYCODONE: NEGATIVE
PCP UR QL SCN: NEGATIVE

## 2024-05-07 PROCEDURE — 6360000002 HC RX W HCPCS: Performed by: STUDENT IN AN ORGANIZED HEALTH CARE EDUCATION/TRAINING PROGRAM

## 2024-05-07 PROCEDURE — 2709999900 HC NON-CHARGEABLE SUPPLY: Performed by: OBSTETRICS & GYNECOLOGY

## 2024-05-07 PROCEDURE — 6360000002 HC RX W HCPCS: Performed by: REGISTERED NURSE

## 2024-05-07 PROCEDURE — 3700000001 HC ADD 15 MINUTES (ANESTHESIA): Performed by: OBSTETRICS & GYNECOLOGY

## 2024-05-07 PROCEDURE — 3700000000 HC ANESTHESIA ATTENDED CARE: Performed by: OBSTETRICS & GYNECOLOGY

## 2024-05-07 PROCEDURE — 3609079900 HC CESAREAN SECTION: Performed by: OBSTETRICS & GYNECOLOGY

## 2024-05-07 PROCEDURE — 2500000003 HC RX 250 WO HCPCS: Performed by: OBSTETRICS & GYNECOLOGY

## 2024-05-07 PROCEDURE — 2580000003 HC RX 258: Performed by: OBSTETRICS & GYNECOLOGY

## 2024-05-07 PROCEDURE — 80307 DRUG TEST PRSMV CHEM ANLYZR: CPT

## 2024-05-07 PROCEDURE — 6360000002 HC RX W HCPCS: Performed by: OBSTETRICS & GYNECOLOGY

## 2024-05-07 PROCEDURE — 1200000000 HC SEMI PRIVATE

## 2024-05-07 PROCEDURE — 6370000000 HC RX 637 (ALT 250 FOR IP): Performed by: OBSTETRICS & GYNECOLOGY

## 2024-05-07 PROCEDURE — A4216 STERILE WATER/SALINE, 10 ML: HCPCS | Performed by: OBSTETRICS & GYNECOLOGY

## 2024-05-07 PROCEDURE — 6360000002 HC RX W HCPCS

## 2024-05-07 PROCEDURE — 7100000000 HC PACU RECOVERY - FIRST 15 MIN: Performed by: OBSTETRICS & GYNECOLOGY

## 2024-05-07 PROCEDURE — 7100000001 HC PACU RECOVERY - ADDTL 15 MIN: Performed by: OBSTETRICS & GYNECOLOGY

## 2024-05-07 RX ORDER — DEXAMETHASONE SODIUM PHOSPHATE 10 MG/ML
INJECTION, EMULSION INTRAMUSCULAR; INTRAVENOUS PRN
Status: DISCONTINUED | OUTPATIENT
Start: 2024-05-07 | End: 2024-05-07 | Stop reason: SDUPTHER

## 2024-05-07 RX ORDER — METHYLERGONOVINE MALEATE 0.2 MG/ML
200 INJECTION INTRAVENOUS PRN
Status: DISCONTINUED | OUTPATIENT
Start: 2024-05-07 | End: 2024-05-09 | Stop reason: HOSPADM

## 2024-05-07 RX ORDER — MORPHINE SULFATE 0.5 MG/ML
INJECTION, SOLUTION EPIDURAL; INTRATHECAL; INTRAVENOUS
Status: COMPLETED | OUTPATIENT
Start: 2024-05-07 | End: 2024-05-07

## 2024-05-07 RX ORDER — DOCUSATE SODIUM 100 MG/1
100 CAPSULE, LIQUID FILLED ORAL 2 TIMES DAILY
Status: DISCONTINUED | OUTPATIENT
Start: 2024-05-07 | End: 2024-05-09 | Stop reason: HOSPADM

## 2024-05-07 RX ORDER — OXYCODONE HYDROCHLORIDE 5 MG/1
10 TABLET ORAL EVERY 4 HOURS PRN
Status: ACTIVE | OUTPATIENT
Start: 2024-05-07 | End: 2024-05-08

## 2024-05-07 RX ORDER — PHENYLEPHRINE HCL IN 0.9% NACL 1 MG/10 ML
SYRINGE (ML) INTRAVENOUS PRN
Status: DISCONTINUED | OUTPATIENT
Start: 2024-05-07 | End: 2024-05-07 | Stop reason: SDUPTHER

## 2024-05-07 RX ORDER — MODIFIED LANOLIN
OINTMENT (GRAM) TOPICAL
Status: DISCONTINUED | OUTPATIENT
Start: 2024-05-07 | End: 2024-05-09 | Stop reason: HOSPADM

## 2024-05-07 RX ORDER — ACETAMINOPHEN 325 MG/1
975 TABLET ORAL ONCE
Status: COMPLETED | OUTPATIENT
Start: 2024-05-07 | End: 2024-05-07

## 2024-05-07 RX ORDER — CITRIC ACID/SODIUM CITRATE 334-500MG
15 SOLUTION, ORAL ORAL ONCE
Status: COMPLETED | OUTPATIENT
Start: 2024-05-07 | End: 2024-05-07

## 2024-05-07 RX ORDER — SODIUM CHLORIDE, SODIUM LACTATE, POTASSIUM CHLORIDE, CALCIUM CHLORIDE 600; 310; 30; 20 MG/100ML; MG/100ML; MG/100ML; MG/100ML
INJECTION, SOLUTION INTRAVENOUS CONTINUOUS
Status: DISCONTINUED | OUTPATIENT
Start: 2024-05-07 | End: 2024-05-07

## 2024-05-07 RX ORDER — SODIUM CHLORIDE, SODIUM LACTATE, POTASSIUM CHLORIDE, CALCIUM CHLORIDE 600; 310; 30; 20 MG/100ML; MG/100ML; MG/100ML; MG/100ML
INJECTION, SOLUTION INTRAVENOUS CONTINUOUS
Status: DISCONTINUED | OUTPATIENT
Start: 2024-05-07 | End: 2024-05-09 | Stop reason: HOSPADM

## 2024-05-07 RX ORDER — BUPIVACAINE HYDROCHLORIDE 7.5 MG/ML
INJECTION, SOLUTION INTRASPINAL
Status: COMPLETED | OUTPATIENT
Start: 2024-05-07 | End: 2024-05-07

## 2024-05-07 RX ORDER — SODIUM CHLORIDE, SODIUM LACTATE, POTASSIUM CHLORIDE, AND CALCIUM CHLORIDE .6; .31; .03; .02 G/100ML; G/100ML; G/100ML; G/100ML
1000 INJECTION, SOLUTION INTRAVENOUS ONCE
Status: COMPLETED | OUTPATIENT
Start: 2024-05-07 | End: 2024-05-07

## 2024-05-07 RX ORDER — FENTANYL CITRATE 50 UG/ML
INJECTION, SOLUTION INTRAMUSCULAR; INTRAVENOUS
Status: COMPLETED | OUTPATIENT
Start: 2024-05-07 | End: 2024-05-07

## 2024-05-07 RX ORDER — ACETAMINOPHEN 500 MG
1000 TABLET ORAL EVERY 8 HOURS SCHEDULED
Status: DISCONTINUED | OUTPATIENT
Start: 2024-05-07 | End: 2024-05-09 | Stop reason: HOSPADM

## 2024-05-07 RX ORDER — NALBUPHINE HYDROCHLORIDE 10 MG/ML
5 INJECTION, SOLUTION INTRAMUSCULAR; INTRAVENOUS; SUBCUTANEOUS EVERY 4 HOURS PRN
Status: DISPENSED | OUTPATIENT
Start: 2024-05-07 | End: 2024-05-08

## 2024-05-07 RX ORDER — IBUPROFEN 800 MG/1
800 TABLET ORAL EVERY 8 HOURS
Status: DISCONTINUED | OUTPATIENT
Start: 2024-05-09 | End: 2024-05-08 | Stop reason: SDUPTHER

## 2024-05-07 RX ORDER — OXYTOCIN 10 [USP'U]/ML
INJECTION, SOLUTION INTRAMUSCULAR; INTRAVENOUS PRN
Status: DISCONTINUED | OUTPATIENT
Start: 2024-05-07 | End: 2024-05-07 | Stop reason: SDUPTHER

## 2024-05-07 RX ORDER — SODIUM CHLORIDE 0.9 % (FLUSH) 0.9 %
5-40 SYRINGE (ML) INJECTION PRN
Status: DISCONTINUED | OUTPATIENT
Start: 2024-05-07 | End: 2024-05-09 | Stop reason: HOSPADM

## 2024-05-07 RX ORDER — MISOPROSTOL 200 UG/1
1000 TABLET ORAL PRN
Status: DISCONTINUED | OUTPATIENT
Start: 2024-05-07 | End: 2024-05-09 | Stop reason: HOSPADM

## 2024-05-07 RX ORDER — ONDANSETRON 2 MG/ML
4 INJECTION INTRAMUSCULAR; INTRAVENOUS EVERY 6 HOURS PRN
Status: DISCONTINUED | OUTPATIENT
Start: 2024-05-08 | End: 2024-05-09 | Stop reason: HOSPADM

## 2024-05-07 RX ORDER — BISACODYL 10 MG
10 SUPPOSITORY, RECTAL RECTAL DAILY PRN
Status: DISCONTINUED | OUTPATIENT
Start: 2024-05-07 | End: 2024-05-09 | Stop reason: HOSPADM

## 2024-05-07 RX ORDER — METOCLOPRAMIDE HYDROCHLORIDE 5 MG/ML
10 INJECTION INTRAMUSCULAR; INTRAVENOUS ONCE
Status: COMPLETED | OUTPATIENT
Start: 2024-05-07 | End: 2024-05-07

## 2024-05-07 RX ORDER — SODIUM CHLORIDE 9 MG/ML
INJECTION, SOLUTION INTRAVENOUS PRN
Status: DISCONTINUED | OUTPATIENT
Start: 2024-05-07 | End: 2024-05-09 | Stop reason: HOSPADM

## 2024-05-07 RX ORDER — SODIUM CHLORIDE 0.9 % (FLUSH) 0.9 %
5-40 SYRINGE (ML) INJECTION EVERY 12 HOURS SCHEDULED
Status: DISCONTINUED | OUTPATIENT
Start: 2024-05-07 | End: 2024-05-09 | Stop reason: HOSPADM

## 2024-05-07 RX ORDER — GLYCOPYRROLATE 1 MG/5 ML
SYRINGE (ML) INTRAVENOUS PRN
Status: DISCONTINUED | OUTPATIENT
Start: 2024-05-07 | End: 2024-05-07 | Stop reason: SDUPTHER

## 2024-05-07 RX ORDER — ONDANSETRON 2 MG/ML
4 INJECTION INTRAMUSCULAR; INTRAVENOUS EVERY 6 HOURS PRN
Status: ACTIVE | OUTPATIENT
Start: 2024-05-07 | End: 2024-05-08

## 2024-05-07 RX ORDER — OXYCODONE HYDROCHLORIDE 5 MG/1
10 TABLET ORAL EVERY 4 HOURS PRN
Status: DISCONTINUED | OUTPATIENT
Start: 2024-05-08 | End: 2024-05-09 | Stop reason: HOSPADM

## 2024-05-07 RX ORDER — OXYTOCIN/0.9 % SODIUM CHLORIDE 30/500 ML
87.3 PLASTIC BAG, INJECTION (ML) INTRAVENOUS CONTINUOUS PRN
Status: DISCONTINUED | OUTPATIENT
Start: 2024-05-07 | End: 2024-05-07

## 2024-05-07 RX ORDER — OXYCODONE HYDROCHLORIDE 5 MG/1
5 TABLET ORAL EVERY 4 HOURS PRN
Status: ACTIVE | OUTPATIENT
Start: 2024-05-07 | End: 2024-05-08

## 2024-05-07 RX ORDER — KETOROLAC TROMETHAMINE 30 MG/ML
30 INJECTION, SOLUTION INTRAMUSCULAR; INTRAVENOUS EVERY 6 HOURS PRN
Status: DISPENSED | OUTPATIENT
Start: 2024-05-07 | End: 2024-05-08

## 2024-05-07 RX ORDER — SODIUM CHLORIDE 0.9 % (FLUSH) 0.9 %
10 SYRINGE (ML) INJECTION PRN
Status: DISCONTINUED | OUTPATIENT
Start: 2024-05-07 | End: 2024-05-07

## 2024-05-07 RX ORDER — ONDANSETRON 2 MG/ML
INJECTION INTRAMUSCULAR; INTRAVENOUS PRN
Status: DISCONTINUED | OUTPATIENT
Start: 2024-05-07 | End: 2024-05-07 | Stop reason: SDUPTHER

## 2024-05-07 RX ORDER — ONDANSETRON 2 MG/ML
4 INJECTION INTRAMUSCULAR; INTRAVENOUS EVERY 6 HOURS PRN
Status: DISCONTINUED | OUTPATIENT
Start: 2024-05-07 | End: 2024-05-07

## 2024-05-07 RX ORDER — SODIUM CHLORIDE 9 MG/ML
INJECTION, SOLUTION INTRAVENOUS PRN
Status: DISCONTINUED | OUTPATIENT
Start: 2024-05-07 | End: 2024-05-07

## 2024-05-07 RX ORDER — ATROPINE SULFATE 0.1 MG/ML
INJECTION INTRAVENOUS PRN
Status: DISCONTINUED | OUTPATIENT
Start: 2024-05-07 | End: 2024-05-07 | Stop reason: SDUPTHER

## 2024-05-07 RX ORDER — ONDANSETRON 4 MG/1
4 TABLET, ORALLY DISINTEGRATING ORAL EVERY 8 HOURS PRN
Status: DISCONTINUED | OUTPATIENT
Start: 2024-05-08 | End: 2024-05-09 | Stop reason: HOSPADM

## 2024-05-07 RX ORDER — KETOROLAC TROMETHAMINE 30 MG/ML
30 INJECTION, SOLUTION INTRAMUSCULAR; INTRAVENOUS EVERY 6 HOURS
Status: DISCONTINUED | OUTPATIENT
Start: 2024-05-08 | End: 2024-05-08 | Stop reason: SDUPTHER

## 2024-05-07 RX ORDER — NALOXONE HYDROCHLORIDE 0.4 MG/ML
INJECTION, SOLUTION INTRAMUSCULAR; INTRAVENOUS; SUBCUTANEOUS PRN
Status: ACTIVE | OUTPATIENT
Start: 2024-05-07 | End: 2024-05-08

## 2024-05-07 RX ORDER — OXYCODONE HYDROCHLORIDE 5 MG/1
5 TABLET ORAL EVERY 4 HOURS PRN
Status: DISCONTINUED | OUTPATIENT
Start: 2024-05-08 | End: 2024-05-09 | Stop reason: HOSPADM

## 2024-05-07 RX ORDER — SODIUM CHLORIDE 0.9 % (FLUSH) 0.9 %
10 SYRINGE (ML) INJECTION EVERY 12 HOURS SCHEDULED
Status: DISCONTINUED | OUTPATIENT
Start: 2024-05-07 | End: 2024-05-07

## 2024-05-07 RX ADMIN — BUPIVACAINE HYDROCHLORIDE 12 MG: 7.5 INJECTION INTRAVENOUS at 12:25

## 2024-05-07 RX ADMIN — OXYTOCIN 20 ML: 10 INJECTION, SOLUTION INTRAMUSCULAR; INTRAVENOUS at 12:50

## 2024-05-07 RX ADMIN — SODIUM CHLORIDE, POTASSIUM CHLORIDE, SODIUM LACTATE AND CALCIUM CHLORIDE: 600; 310; 30; 20 INJECTION, SOLUTION INTRAVENOUS at 21:06

## 2024-05-07 RX ADMIN — DEXAMETHASONE SODIUM PHOSPHATE 10 MG: 10 INJECTION, EMULSION INTRAMUSCULAR; INTRAVENOUS at 12:39

## 2024-05-07 RX ADMIN — SODIUM CHLORIDE, POTASSIUM CHLORIDE, SODIUM LACTATE AND CALCIUM CHLORIDE 1000 ML: 600; 310; 30; 20 INJECTION, SOLUTION INTRAVENOUS at 09:46

## 2024-05-07 RX ADMIN — METOCLOPRAMIDE 10 MG: 5 INJECTION, SOLUTION INTRAMUSCULAR; INTRAVENOUS at 10:12

## 2024-05-07 RX ADMIN — WATER 2000 MG: 1 INJECTION INTRAMUSCULAR; INTRAVENOUS; SUBCUTANEOUS at 12:20

## 2024-05-07 RX ADMIN — SODIUM CHLORIDE, POTASSIUM CHLORIDE, SODIUM LACTATE AND CALCIUM CHLORIDE: 600; 310; 30; 20 INJECTION, SOLUTION INTRAVENOUS at 10:40

## 2024-05-07 RX ADMIN — Medication 50 MCG: at 12:48

## 2024-05-07 RX ADMIN — Medication 0.4 MG: at 12:35

## 2024-05-07 RX ADMIN — ONDANSETRON 4 MG: 2 INJECTION INTRAMUSCULAR; INTRAVENOUS at 12:39

## 2024-05-07 RX ADMIN — ATROPINE SULFATE 0.2 MG: 0.1 INJECTION, SOLUTION ENDOTRACHEAL; INTRAMUSCULAR; INTRAVENOUS; SUBCUTANEOUS at 12:37

## 2024-05-07 RX ADMIN — SODIUM CHLORIDE, POTASSIUM CHLORIDE, SODIUM LACTATE AND CALCIUM CHLORIDE: 600; 310; 30; 20 INJECTION, SOLUTION INTRAVENOUS at 12:50

## 2024-05-07 RX ADMIN — DOCUSATE SODIUM 100 MG: 100 CAPSULE, LIQUID FILLED ORAL at 21:01

## 2024-05-07 RX ADMIN — KETOROLAC TROMETHAMINE 30 MG: 30 INJECTION, SOLUTION INTRAMUSCULAR at 21:01

## 2024-05-07 RX ADMIN — Medication 100 MCG: at 12:40

## 2024-05-07 RX ADMIN — ACETAMINOPHEN 975 MG: 325 TABLET ORAL at 10:08

## 2024-05-07 RX ADMIN — SODIUM CHLORIDE, POTASSIUM CHLORIDE, SODIUM LACTATE AND CALCIUM CHLORIDE: 600; 310; 30; 20 INJECTION, SOLUTION INTRAVENOUS at 13:29

## 2024-05-07 RX ADMIN — Medication 87.3 MILLI-UNITS/MIN: at 13:31

## 2024-05-07 RX ADMIN — Medication 200 MCG: at 12:34

## 2024-05-07 RX ADMIN — ONDANSETRON 4 MG: 2 INJECTION INTRAMUSCULAR; INTRAVENOUS at 10:16

## 2024-05-07 RX ADMIN — SODIUM CITRATE AND CITRIC ACID MONOHYDRATE 15 ML: 500; 334 SOLUTION ORAL at 12:14

## 2024-05-07 RX ADMIN — FAMOTIDINE 20 MG: 10 INJECTION, SOLUTION INTRAVENOUS at 10:17

## 2024-05-07 RX ADMIN — MORPHINE SULFATE 0.2 MG: 0.5 INJECTION, SOLUTION EPIDURAL; INTRATHECAL; INTRAVENOUS at 12:25

## 2024-05-07 RX ADMIN — FENTANYL CITRATE 20 MCG: 50 INJECTION, SOLUTION INTRAMUSCULAR; INTRAVENOUS at 12:25

## 2024-05-07 ASSESSMENT — PAIN SCALES - GENERAL
PAINLEVEL_OUTOF10: 0
PAINLEVEL_OUTOF10: 0

## 2024-05-07 ASSESSMENT — PAIN - FUNCTIONAL ASSESSMENT: PAIN_FUNCTIONAL_ASSESSMENT: ACTIVITIES ARE NOT PREVENTED

## 2024-05-07 NOTE — OP NOTE
running locked fashion. A second imbricating layer was used for uterine strength.  The abdomen was then copiously irrigated, and hemostasis was assured with Bovie cautery.  The peritoneum was closed with a 3-0 PDS, the fascia was inspected and found to be hemostatic and closed with a #1 vicryl. The subcutaneous tissue was irrigated, made hemostatic with Bovie cautery and reapproximated with 3-0 vicryl pops. The skin was closed with 4 0 vicryl suture (s).  Sponge, lap and needle counts were correct x 2. The patient tolerated the procedure well and was taken to recovery room in stable condition     FAUSTO Obrien M.D.  5/7/2024 1:24 PM

## 2024-05-07 NOTE — ANESTHESIA PROCEDURE NOTES
Spinal Block    Patient location during procedure: OB  End time: 5/7/2024 12:32 PM  Reason for block: primary anesthetic  Staffing  Performed: resident/CRNA   Resident/CRNA: Kiah Singh APRN - CRNA  Performed by: Kiah Singh APRN - CRNA  Authorized by: Markus Connors DO    Spinal Block  Patient position: sitting  Prep: ChloraPrep  Patient monitoring: continuous pulse ox and frequent blood pressure checks  Approach: midline  Location: L3/L4  Guidance: paresthesia technique  Provider prep: sterile gloves and mask  Needle  Needle type: pencil-tip   Needle gauge: 24 G  Needle length: 3.5 in  Assessment  Swirl obtained: Yes  CSF: clear  Attempts: 1  Hemodynamics: stable  Preanesthetic Checklist  Completed: patient identified, IV checked, site marked, risks and benefits discussed, surgical/procedural consents, equipment checked, pre-op evaluation, timeout performed, anesthesia consent given, oxygen available, monitors applied/VS acknowledged, fire risk safety assessment completed and verbalized and blood product R/B/A discussed and consented

## 2024-05-07 NOTE — FLOWSHEET NOTE
Admitted to room 5b24 per bed holding infant from L&D. Vitals and assessment completed. Iv infusing into LW with LR @ 125ml/hr with approx. 700 to count in bag and pitocin @ 87.5ml/hr with approx. 300 to count in bag. Oriented patient and SO to room and plan of care, they voiced understanding. Instructed patient to not get up out of bed on her own, she voiced understanding.

## 2024-05-07 NOTE — H&P
[facility]  History and Physical Update    Pt Name: Caterina Cobb  MRN: [id number]  YOB: 1995  Date of evaluation: 2024    [x] I have examined the patient and reviewed the H&P/Consult and there are no changes to the patient or plans.  29yo  presented for RLTCS.     [] I have examined the patient and reviewed the H&P/Consult and have noted the following changes:            Yasemin Obrien MD,MD  Electronically signed 2024 at 12:13 PM

## 2024-05-07 NOTE — L&D DELIVERY NOTE
Brief Operative Report      Pre-operative Diagnosis:  Term pregnancy, Prior C/S    Post-operative Diagnosis:  Same    Procedure:  RLTCS    Surgeon: FAUSTO Obrien MD     Anesthesia:  Spinal Anesthesia    Estimated blood loss:  600     Findings: See Operative Dictation, VMI cephalic presentation    Complications:  none      See dictated operative report for full details.      Yasemin Obrien MD

## 2024-05-07 NOTE — FLOWSHEET NOTE
Parents watching  and shaken baby video. Infant roomed in this shift with parents. [place] show completed.

## 2024-05-07 NOTE — FLOWSHEET NOTE
To 5c08 for recovery period. Infant skin to skin. Monitors resumed. Assessment completed. SCDs on. Dillard draining clear yellow urine.  at side and supportive.

## 2024-05-07 NOTE — PLAN OF CARE
Problem: Pain  Goal: Verbalizes/displays adequate comfort level or baseline comfort level  Outcome: Progressing  Flowsheets  Taken 2024 1540 by Kika Craig, RN  Verbalizes/displays adequate comfort level or baseline comfort level:   Encourage patient to monitor pain and request assistance   Assess pain using appropriate pain scale  Taken 2024 0909 by Sadia Lambert RN  Verbalizes/displays adequate comfort level or baseline comfort level:   Encourage patient to monitor pain and request assistance   Assess pain using appropriate pain scale   Administer analgesics based on type and severity of pain and evaluate response   Implement non-pharmacological measures as appropriate and evaluate response  Note: Pain goal 4     Problem: Vaginal Birth or  Section  Goal: Fetal and maternal status remain reassuring during the birth process  Description:  Birth OB-Pregnancy care plan goal which identifies if the fetal and maternal status remain reassuring during the birth process  Outcome: Progressing  Flowsheets (Taken 2024 09 by Sadia Lambert, RN)  Fetal and Maternal Status Remain Reassuring During the Birth Process:   Monitor vital signs   Monitor uterine activity   Deep vein thrombosis prophylaxis ( delivery)   Monitor fetal heart rate   Surgical antibiotic prophylaxis ( delivery)     Problem: Postpartum  Goal: Experiences normal postpartum course  Description:  Postpartum OB-Pregnancy care plan goal which identifies if the mother is experiencing a normal postpartum course  Outcome: Progressing  Flowsheets (Taken 2024 1619)  Experiences Normal Postpartum Course:   Monitor maternal vital signs   Assess uterine involution     Problem: Postpartum  Goal: Appropriate maternal -  bonding  Description:  Postpartum OB-Pregnancy care plan goal which identifies if the mother and  are bonding appropriately  Outcome: Progressing  Note: Bonding well with     appropriate   Identify discharge learning needs (meds, wound care, etc)   Arrange for interpreters to assist at discharge as needed   Refer to discharge planning if patient needs post-hospital services based on physician order or complex needs related to functional status, cognitive ability or social support system     Problem: Chronic Conditions and Co-morbidities  Goal: Patient's chronic conditions and co-morbidity symptoms are monitored and maintained or improved  Outcome: Progressing  Flowsheets (Taken 5/7/2024 1619)  Care Plan - Patient's Chronic Conditions and Co-Morbidity Symptoms are Monitored and Maintained or Improved: Monitor and assess patient's chronic conditions and comorbid symptoms for stability, deterioration, or improvement   Care plan reviewed with patient and SO.  Patient and SO verbalize understanding of the plan of care and contribute to goal setting.

## 2024-05-07 NOTE — ANESTHESIA PRE PROCEDURE
Department of Anesthesiology  Preprocedure Note       Name:  Caterina Cobb   Age:  28 y.o.  :  1995                                          MRN:  [id number]         Date:  2024      Surgeon: Surgeon(s):  Yasemin Obrien MD    Procedure: Procedure(s):  REPEAT     Medications prior to admission:   Prior to Admission medications    Medication Sig Start Date End Date Taking? Authorizing Provider   Prenatal MV-Min-Fe Fum-FA-DHA (PRENATAL 1 PO) Take 1 tablet by mouth daily     Provider, MD Jennifer       Current medications:    Current Facility-Administered Medications   Medication Dose Route Frequency Provider Last Rate Last Admin   • lactated ringers IV soln infusion   IntraVENous Continuous Yasemin Obrien  mL/hr at 24 1040 New Bag at 24 1040   • sodium chloride flush 0.9 % injection 10 mL  10 mL IntraVENous 2 times per day Yasemin Obrien MD       • sodium chloride flush 0.9 % injection 10 mL  10 mL IntraVENous PRN Yasemin Obrien MD       • 0.9 % sodium chloride infusion   IntraVENous PRN Yasemin Obrien MD       • citric acid-sodium citrate (BICITRA) solution 15 mL  15 mL Oral Once Yasemin Obrien MD       • oxytocin (PITOCIN) 30 units in 500 mL infusion  87.3 dominick-units/min IntraVENous Continuous PRN Yasemin Obrien MD        And   • oxytocin (PITOCIN) 10 unit bolus from the bag  10 Units IntraVENous PRN Yasemin Obrien MD       • ondansetron (ZOFRAN) injection 4 mg  4 mg IntraVENous Q6H PRN Yasemin Obrien MD   4 mg at 24 1016   • ceFAZolin (ANCEF) 2,000 mg in sterile water 20 mL IV syringe  2,000 mg IntraVENous Once Yasemin Obrien MD           Allergies:  No Known Allergies    Problem List:    Patient Active Problem List   Diagnosis Code   • Encounter for elective induction of labor Z34.90   •  delivery delivered O82   • Encounter for maternal care for low transverse scar from repeat  delivery O34.211       Past Medical History:  History

## 2024-05-07 NOTE — PLAN OF CARE
transferring infant if caregiver noted to have fall risk factors     Problem: Discharge Planning  Goal: Discharge to home or other facility with appropriate resources  Outcome: Progressing  Flowsheets (Taken 5/7/2024 2302)  Discharge to home or other facility with appropriate resources:   Identify barriers to discharge with patient and caregiver   Arrange for needed discharge resources and transportation as appropriate   Identify discharge learning needs (meds, wound care, etc)   Arrange for interpreters to assist at discharge as needed   Refer to discharge planning if patient needs post-hospital services based on physician order or complex needs related to functional status, cognitive ability or social support system   Care plan reviewed with patient and .  Patient and  verbalize understanding of the plan of care and contribute to goal setting.

## 2024-05-07 NOTE — FLOWSHEET NOTE
Pt here for scheduled repeat . Pt denies any problems or concerns. Pt feeling lots of fetal mov't. Denies contractions or cramping. No bleeding or leaking of fluid. Oriented to room and surroundings. To bathroom urine sample obtained for urine drug screen and consent given.

## 2024-05-08 LAB
DEPRECATED RDW RBC AUTO: 48.5 FL (ref 35–45)
ERYTHROCYTE [DISTWIDTH] IN BLOOD BY AUTOMATED COUNT: 14.7 % (ref 11.5–14.5)
HCT VFR BLD AUTO: 31.2 % (ref 37–47)
HGB BLD-MCNC: 10.2 GM/DL (ref 12–16)
MCH RBC QN AUTO: 29.5 PG (ref 26–33)
MCHC RBC AUTO-ENTMCNC: 32.7 GM/DL (ref 32.2–35.5)
MCV RBC AUTO: 90.2 FL (ref 81–99)
PLATELET # BLD AUTO: 214 THOU/MM3 (ref 130–400)
PMV BLD AUTO: 11.8 FL (ref 9.4–12.4)
RBC # BLD AUTO: 3.46 MILL/MM3 (ref 4.2–5.4)
WBC # BLD AUTO: 13.2 THOU/MM3 (ref 4.8–10.8)

## 2024-05-08 PROCEDURE — 2580000003 HC RX 258: Performed by: OBSTETRICS & GYNECOLOGY

## 2024-05-08 PROCEDURE — 6360000002 HC RX W HCPCS: Performed by: STUDENT IN AN ORGANIZED HEALTH CARE EDUCATION/TRAINING PROGRAM

## 2024-05-08 PROCEDURE — 85027 COMPLETE CBC AUTOMATED: CPT

## 2024-05-08 PROCEDURE — 1200000000 HC SEMI PRIVATE

## 2024-05-08 PROCEDURE — 36415 COLL VENOUS BLD VENIPUNCTURE: CPT

## 2024-05-08 PROCEDURE — 6370000000 HC RX 637 (ALT 250 FOR IP): Performed by: OBSTETRICS & GYNECOLOGY

## 2024-05-08 RX ORDER — IBUPROFEN 800 MG/1
800 TABLET ORAL EVERY 8 HOURS PRN
Status: DISCONTINUED | OUTPATIENT
Start: 2024-05-08 | End: 2024-05-09 | Stop reason: HOSPADM

## 2024-05-08 RX ADMIN — DOCUSATE SODIUM 100 MG: 100 CAPSULE, LIQUID FILLED ORAL at 21:27

## 2024-05-08 RX ADMIN — SODIUM CHLORIDE, POTASSIUM CHLORIDE, SODIUM LACTATE AND CALCIUM CHLORIDE: 600; 310; 30; 20 INJECTION, SOLUTION INTRAVENOUS at 05:11

## 2024-05-08 RX ADMIN — KETOROLAC TROMETHAMINE 30 MG: 30 INJECTION, SOLUTION INTRAMUSCULAR at 10:55

## 2024-05-08 RX ADMIN — IBUPROFEN 800 MG: 800 TABLET, FILM COATED ORAL at 18:10

## 2024-05-08 RX ADMIN — DOCUSATE SODIUM 100 MG: 100 CAPSULE, LIQUID FILLED ORAL at 08:36

## 2024-05-08 RX ADMIN — ACETAMINOPHEN 1000 MG: 500 TABLET ORAL at 21:27

## 2024-05-08 RX ADMIN — ACETAMINOPHEN 1000 MG: 500 TABLET ORAL at 13:31

## 2024-05-08 RX ADMIN — KETOROLAC TROMETHAMINE 30 MG: 30 INJECTION, SOLUTION INTRAMUSCULAR at 05:11

## 2024-05-08 ASSESSMENT — PAIN DESCRIPTION - DESCRIPTORS
DESCRIPTORS: DISCOMFORT
DESCRIPTORS: ACHING;SORE
DESCRIPTORS: DISCOMFORT;CRAMPING

## 2024-05-08 ASSESSMENT — PAIN DESCRIPTION - LOCATION
LOCATION: ABDOMEN;INCISION
LOCATION: INCISION
LOCATION: ABDOMEN;INCISION
LOCATION: ABDOMEN;INCISION

## 2024-05-08 ASSESSMENT — PAIN SCALES - GENERAL
PAINLEVEL_OUTOF10: 1
PAINLEVEL_OUTOF10: 3
PAINLEVEL_OUTOF10: 3

## 2024-05-08 ASSESSMENT — PAIN - FUNCTIONAL ASSESSMENT: PAIN_FUNCTIONAL_ASSESSMENT: ACTIVITIES ARE NOT PREVENTED

## 2024-05-08 NOTE — FLOWSHEET NOTE
Patient up to bathroom for second time. Ambulated to bathroom without difficulty. Voided large amount. Ambulated back to bed without difficulty. Fundal check post void at U/-1.

## 2024-05-08 NOTE — PLAN OF CARE
Problem: Pain  Goal: Verbalizes/displays adequate comfort level or baseline comfort level  Outcome: Progressing  Flowsheets (Taken 2024)  Verbalizes/displays adequate comfort level or baseline comfort level:   Assess pain using appropriate pain scale   Encourage patient to monitor pain and request assistance   Administer analgesics based on type and severity of pain and evaluate response   Implement non-pharmacological measures as appropriate and evaluate response     Problem: Postpartum  Goal: Experiences normal postpartum course  Description:  Postpartum OB-Pregnancy care plan goal which identifies if the mother is experiencing a normal postpartum course  Outcome: Progressing  Flowsheets (Taken 2024)  Experiences Normal Postpartum Course:   Monitor maternal vital signs   Assess uterine involution     Problem: Postpartum  Goal: Appropriate maternal -  bonding  Description:  Postpartum OB-Pregnancy care plan goal which identifies if the mother and  are bonding appropriately  Outcome: Progressing  Note: Bonding with baby, participating in infant care.      Problem: Postpartum  Goal: Establishment of infant feeding pattern  Description:  Postpartum OB-Pregnancy care plan goal which identifies if the mother is establishing a feeding pattern with their   Outcome: Progressing  Flowsheets (Taken 2024)  Establishment of Infant Feeding Pattern:   Assess breast/bottle feeding   Refer to lactation as needed     Problem: Postpartum  Goal: Incisions, wounds, or drain sites healing without S/S of infection  Outcome: Progressing  Flowsheets (Taken 2024)  Incisions, Wounds, or Drain Sites Healing Without Sign and Symptoms of Infection: TWICE DAILY: Assess and document dressing/incision, wound bed, drain sites and surrounding tissue     Problem: Infection - Adult  Goal: Absence of fever/infection during anticipated neutropenic period  Outcome: Progressing  Flowsheets (Taken

## 2024-05-08 NOTE — FLOWSHEET NOTE
Patient up to bathroom for first time. Ambulated to bathroom without difficulty. Voided large amount. Patient educated on ramírez care, use of ramírez bottle, extra pads, emergency call light, and to call with any increased bleeding or clots. Patient states understanding. Ambulated back to bed without difficulty. Fundal check post void at U/-1.

## 2024-05-08 NOTE — FLOWSHEET NOTE
Patient encouraged to cough and deep breathe every 2 hours while awake. Verbalizes understanding.

## 2024-05-08 NOTE — PLAN OF CARE
Problem: Chronic Conditions and Co-morbidities  Goal: Patient's chronic conditions and co-morbidity symptoms are monitored and maintained or improved  5/7/2024 2224 by Mague Pittman RN  Outcome: Progressing  Flowsheets (Taken 5/7/2024 2224)  Care Plan - Patient's Chronic Conditions and Co-Morbidity Symptoms are Monitored and Maintained or Improved: Monitor and assess patient's chronic conditions and comorbid symptoms for stability, deterioration, or improvement  5/7/2024 1619 by Kika Craig, RN  Outcome: Progressing  Flowsheets (Taken 5/7/2024 1619)  Care Plan - Patient's Chronic Conditions and Co-Morbidity Symptoms are Monitored and Maintained or Improved: Monitor and assess patient's chronic conditions and comorbid symptoms for stability, deterioration, or improvement     Problem: Safety - Adult  Goal: Free from fall injury  5/7/2024 2224 by Mague Pittman, RN  Outcome: Progressing  Flowsheets  Taken 5/7/2024 2224  Free From Fall Injury: Instruct family/caregiver on patient safety  Taken 5/7/2024 2059  Free From Fall Injury: Instruct family/caregiver on patient safety  5/7/2024 1619 by Kika Craig, RN  Outcome: Progressing  Flowsheets  Taken 5/7/2024 1540 by Kika Craig, RN  Free From Fall Injury: Instruct family/caregiver on patient safety  Taken 5/7/2024 0909 by Sadia Lambert RN  Free From Fall Injury:   Instruct family/caregiver on patient safety   Based on caregiver fall risk screen, instruct family/caregiver to ask for assistance with transferring infant if caregiver noted to have fall risk factors  Note: No falls this shift     Problem: Infection - Adult  Goal: Absence of fever/infection during anticipated neutropenic period  5/7/2024 2224 by Mague Pittman, RN  Outcome: Progressing  Flowsheets  Taken 5/7/2024 2224  Absence of fever/infection during anticipated neutropenic period: Monitor white blood cell count  Taken 5/7/2024 2059  Absence of fever/infection during anticipated  neutropenic period: Monitor white blood cell count  2024 by Kika Craig RN  Outcome: Progressing  Flowsheets (Taken 2024)  Absence of fever/infection during anticipated neutropenic period: Monitor white blood cell count     Problem: Postpartum  Goal: Experiences normal postpartum course  Description:  Postpartum OB-Pregnancy care plan goal which identifies if the mother is experiencing a normal postpartum course  2024 by Mague Pittman RN  Outcome: Progressing  Flowsheets  Taken 2024  Experiences Normal Postpartum Course:   Assess uterine involution   Monitor maternal vital signs  Taken 2024  Experiences Normal Postpartum Course:   Monitor maternal vital signs   Assess uterine involution  2024 by Kika Craig RN  Outcome: Progressing  Flowsheets (Taken 2024)  Experiences Normal Postpartum Course:   Monitor maternal vital signs   Assess uterine involution  Goal: Appropriate maternal -  bonding  Description:  Postpartum OB-Pregnancy care plan goal which identifies if the mother and  are bonding appropriately  2024 by Mague Pittman RN  Outcome: Progressing  Note: Bonding with baby, participating in infant care.    2024 by Kika Craig, RN  Outcome: Progressing  Note: Bonding well with   Goal: Establishment of infant feeding pattern  Description:  Postpartum OB-Pregnancy care plan goal which identifies if the mother is establishing a feeding pattern with their   2024 by Mague Pittman, RN  Outcome: Progressing  Flowsheets  Taken 2024  Establishment of Infant Feeding Pattern:   Assess breast/bottle feeding   Refer to lactation as needed  Taken 2024  Establishment of Infant Feeding Pattern:   Assess breast/bottle feeding   Refer to lactation as needed  2024 by Kika Craig, RN  Outcome: Progressing  Flowsheets (Taken 2024 161)  Establishment of Infant

## 2024-05-08 NOTE — ANESTHESIA POSTPROCEDURE EVALUATION
Department of Anesthesiology  Postprocedure Note    Patient: Caterina Cobb  MRN: [id number]  YOB: 1995  Date of evaluation: 2024    Procedure Summary       Date: 24 Room / Location: [facility]&[address]    Anesthesia Start: 1220 Anesthesia Stop: 1322    Procedure: REPEAT  (Uterus) Diagnosis:       Previous  section      (Previous  section [Z98.891])    Surgeons: Yasemin Obrien MD Responsible Provider: Markus Connors DO    Anesthesia Type: spinal ASA Status: 2            Anesthesia Type: No value filed.    Brent Phase I: Brent Score: 9    Brent Phase II: Brent Score: 9    Anesthesia Post Evaluation    Patient location during evaluation: floor  Patient participation: complete - patient participated  Level of consciousness: awake and alert  Airway patency: patent  Nausea & Vomiting: no nausea and no vomiting  Cardiovascular status: hemodynamically stable  Respiratory status: acceptable and spontaneous ventilation  Hydration status: euvolemic  Pain management: satisfactory to patient        No notable events documented.

## 2024-05-09 VITALS
HEART RATE: 86 BPM | HEIGHT: 65 IN | DIASTOLIC BLOOD PRESSURE: 76 MMHG | OXYGEN SATURATION: 100 % | BODY MASS INDEX: 33.66 KG/M2 | WEIGHT: 202 LBS | SYSTOLIC BLOOD PRESSURE: 135 MMHG | TEMPERATURE: 97.5 F | RESPIRATION RATE: 16 BRPM

## 2024-05-09 PROCEDURE — 6370000000 HC RX 637 (ALT 250 FOR IP): Performed by: OBSTETRICS & GYNECOLOGY

## 2024-05-09 RX ORDER — IBUPROFEN 200 MG
800 TABLET ORAL EVERY 8 HOURS PRN
COMMUNITY
Start: 2024-05-09

## 2024-05-09 RX ORDER — PSEUDOEPHEDRINE HCL 30 MG
100 TABLET ORAL 2 TIMES DAILY PRN
COMMUNITY
Start: 2024-05-09

## 2024-05-09 RX ORDER — OXYCODONE HYDROCHLORIDE AND ACETAMINOPHEN 5; 325 MG/1; MG/1
1 TABLET ORAL EVERY 6 HOURS PRN
Qty: 28 TABLET | Refills: 0 | Status: SHIPPED | OUTPATIENT
Start: 2024-05-09 | End: 2024-05-16

## 2024-05-09 RX ADMIN — IBUPROFEN 800 MG: 800 TABLET, FILM COATED ORAL at 01:43

## 2024-05-09 RX ADMIN — IBUPROFEN 800 MG: 800 TABLET, FILM COATED ORAL at 09:56

## 2024-05-09 RX ADMIN — OXYCODONE 5 MG: 5 TABLET ORAL at 14:07

## 2024-05-09 RX ADMIN — ACETAMINOPHEN 1000 MG: 500 TABLET ORAL at 05:44

## 2024-05-09 RX ADMIN — ACETAMINOPHEN 1000 MG: 500 TABLET ORAL at 14:04

## 2024-05-09 ASSESSMENT — PAIN DESCRIPTION - DESCRIPTORS
DESCRIPTORS: DISCOMFORT
DESCRIPTORS: ACHING;SORE
DESCRIPTORS: DISCOMFORT

## 2024-05-09 ASSESSMENT — PAIN - FUNCTIONAL ASSESSMENT
PAIN_FUNCTIONAL_ASSESSMENT: ACTIVITIES ARE NOT PREVENTED

## 2024-05-09 ASSESSMENT — PAIN DESCRIPTION - LOCATION
LOCATION: ABDOMEN;INCISION

## 2024-05-09 ASSESSMENT — PAIN SCALES - GENERAL
PAINLEVEL_OUTOF10: 4
PAINLEVEL_OUTOF10: 3
PAINLEVEL_OUTOF10: 3
PAINLEVEL_OUTOF10: 2

## 2024-05-09 NOTE — FLOWSHEET NOTE
AVS reviewed with patient. Patient educated on postpartum care, postpartum depression, and when to call doctor. Patient verbalizes understanding. Patient states understanding of follow up appointment. All questions answered. Patient discharged home.

## 2024-05-09 NOTE — PROGRESS NOTES
Subjective:     Postpartum Day 2:  Delivery    Doing well    Objective:    VITALS:  /76   Pulse 86   Temp 97.5 °F (36.4 °C) (Oral)   Resp 16   Ht 1.651 m (5' 5\")   Wt 91.6 kg (202 lb)   SpO2 100%   Breastfeeding Unknown   BMI 33.61 kg/m²     Vitals:    24 0851   BP: 135/76   Pulse: 86   Resp: 16   Temp: 97.5 °F (36.4 °C)   SpO2: 100%         General:    alert, appears stated age, and cooperative   Bowel Sounds:  active           Incision:  healing well, no significant drainage, no dehiscence, no significant erythema         DATA: CBC   Lab Results   Component Value Date    WBC 13.2 (H) 2024    HGB 10.2 (L) 2024    HCT 31.2 (L) 2024     2024        Assessment:     Status post  section. Doing well postoperatively.     Plan:     Continue current care.  D/C home today or tomorrow      Yasemin Obrien MD

## 2024-05-09 NOTE — LACTATION NOTE
Met with pt in room.  Pt is pumping only as she has previously. Pt had oversupply with first baby and was only able to feed breast milk until one year.  Offered support prn.

## 2024-05-09 NOTE — FLOWSHEET NOTE
Infant has roomed in with mother this shift except for maternal exhaustion.  Benefits of rooming in discussed.

## 2024-05-09 NOTE — PLAN OF CARE
Problem: Pain  Goal: Verbalizes/displays adequate comfort level or baseline comfort level  2024 by Krystal Styles RN  Outcome: Progressing  Flowsheets (Taken 2024)  Verbalizes/displays adequate comfort level or baseline comfort level:   Encourage patient to monitor pain and request assistance   Administer analgesics based on type and severity of pain and evaluate response     Problem: Postpartum  Goal: Experiences normal postpartum course  Description:  Postpartum OB-Pregnancy care plan goal which identifies if the mother is experiencing a normal postpartum course  2024 by Krystal Styles RN  Outcome: Progressing  Flowsheets (Taken 2024)  Experiences Normal Postpartum Course:   Monitor maternal vital signs   Assess uterine involution     Problem: Postpartum  Goal: Appropriate maternal -  bonding  Description:  Postpartum OB-Pregnancy care plan goal which identifies if the mother and  are bonding appropriately  2024 by Krystal Styles RN  Outcome: Progressing  Note: Bonding with baby, participating in infant care.       Problem: Postpartum  Goal: Establishment of infant feeding pattern  Description:  Postpartum OB-Pregnancy care plan goal which identifies if the mother is establishing a feeding pattern with their   2024 by Krystal Styles RN  Outcome: Progressing  Flowsheets (Taken 2024)  Establishment of Infant Feeding Pattern: Assess breast/bottle feeding     Problem: Postpartum  Goal: Incisions, wounds, or drain sites healing without S/S of infection  2024 by Krystal Styles, RN  Outcome: Progressing  Flowsheets (Taken 2024)  Incisions, Wounds, or Drain Sites Healing Without Sign and Symptoms of Infection: TWICE DAILY: Assess and document skin integrity     Problem: Safety - Adult  Goal: Free from fall injury  2024 by Krystal Styles, RN  Outcome: Progressing  Flowsheets (Taken 2024)  Free

## 2024-05-09 NOTE — FLOWSHEET NOTE
Post birth warning signs education paper given and reviewed, teaching complete. Beatrice postpartum depression screening discussed with patient, instructed to contact her healthcare provider if her score is > 10. Patient voiced understanding.  Mother's blood type is B+.  Baby's blood type is B+.  Mother did not receive Rhogam.

## 2024-05-09 NOTE — DISCHARGE INSTRUCTIONS
After Your  Delivery Discharge Instructions  [facility]  [phone]  0 [address] [address]    After Discharge Orders:  No future appointments.   [unfilled]     Medical equipment: none     Call the Physician with any  signs and symptoms:    Warning signs regarding incision:  \"Popping\" of stitches or staples  Foul smelling discharge or pus  More redness or streaks around incision than before    Incision care:  Keep incision dry and covered (if necessary)  No tub baths until OK'd by your Physician or Midwife  You may use cold compresses on incision site     After your delivery - signs and symptoms to watch for:  Fever - Oral temperature greater than 100.4 degrees Fahrenheit  Foul-smelling vaginal discharge  Headache unrelieved by \"pain meds\"  Difficulty urinating  Breasts reddened, hard, hot to the touch  Nipple discharge which is foul-smelling or contains pus  Increased pain at the site of the surgical incision  Difficulty breathing with or without chest pain  New calf pain especially if only on one side  Sudden, continuing increased vaginal bleeding with or without clots  Unrelieved feelings of:  Inability to cope  Sadness  Anxiety  Lack of interest in baby  Insomnia  Crying     What to do at home:  See patient education handouts for full information  Resume activity gradually   Don't lift anything heavier than baby and carrier until OK'd by your Physician  No sex until OK'd by your Physician or Midwife  Take care of yourself by sleeping/resting as much as possible  Eat regular nutritious meals  Let someone else care for you, your baby, and housework as much as possible   Take pain medication as prescribed whenever you need them  Wear compression stockings if prescribed   To avoid/relieve constipation take stool softeners if advised   Drink lots of water/fruit juices  Increase fiber in your diet  Breast care: Wear support bra ; use lanolin

## 2024-05-09 NOTE — DISCHARGE SUMMARY
C/Section Discharge Summary    Gestational Age:39w4d    Antepartum complications: none    Date of Delivery: 2024      Condition: Good    Type of Delivery:  (see operative report)    Labs: CBC   Lab Results   Component Value Date    WBC 13.2 (H) 2024    HGB 10.2 (L) 2024    HCT 31.2 (L) 2024     2024        Intrapartum complications: None    Postpartum complications: none    The patient is ambulating well. The patient is tolerating a normal diet.    Discharge Medication:      Medication List        START taking these medications      docusate 100 MG Caps  Commonly known as: COLACE, DULCOLAX  Take 100 mg by mouth 2 times daily as needed for Constipation     ibuprofen 200 MG tablet  Commonly known as: ADVIL;MOTRIN  Take 4 tablets by mouth every 8 hours as needed for Pain     oxyCODONE-acetaminophen 5-325 MG per tablet  Commonly known as: Percocet  Take 1 tablet by mouth every 6 hours as needed for Pain for up to 7 days. Intended supply: 7 days. Take lowest dose possible to manage pain Max Daily Amount: 4 tablets            CONTINUE taking these medications      PRENATAL 1 PO               Where to Get Your Medications        These medications were sent to [facility] [address] - P [phone] - F [phone]  [address] [facility] [address]      Phone: [phone]   oxyCODONE-acetaminophen 5-325 MG per tablet       You can get these medications from any pharmacy    You don't need a prescription for these medications  docusate 100 MG Caps  ibuprofen 200 MG tablet          Discharge Date:     Plan:   Follow up in 1 week(s) for incision check    Yasemin Obrien MD

## 2025-02-27 PROBLEM — Z34.90 ENCOUNTER FOR ELECTIVE INDUCTION OF LABOR: Status: RESOLVED | Noted: 2022-06-20 | Resolved: 2025-02-27

## 2025-02-27 PROBLEM — O34.211 ENCOUNTER FOR MATERNAL CARE FOR LOW TRANSVERSE SCAR FROM REPEAT CESAREAN DELIVERY: Status: RESOLVED | Noted: 2024-05-07 | Resolved: 2025-02-27

## 2025-07-29 ENCOUNTER — TRANSCRIBE ORDERS (OUTPATIENT)
Dept: ADMINISTRATIVE | Age: 30
End: 2025-07-29

## 2025-07-29 ENCOUNTER — LAB (OUTPATIENT)
Dept: LAB | Age: 30
End: 2025-07-29

## 2025-07-29 DIAGNOSIS — N64.4 MASTODYNIA: Primary | ICD-10-CM

## 2025-08-13 LAB — CYTOLOGY THIN PREP PAP: NORMAL

## 2025-08-21 ENCOUNTER — HOSPITAL ENCOUNTER (OUTPATIENT)
Dept: WOMENS IMAGING | Age: 30
Discharge: HOME | End: 2025-08-21
Payer: COMMERCIAL

## 2025-08-21 VITALS — HEIGHT: 66 IN | WEIGHT: 174 LBS | BODY MASS INDEX: 27.97 KG/M2

## 2025-08-21 DIAGNOSIS — N64.4 MASTODYNIA: ICD-10-CM

## 2025-08-21 PROCEDURE — 76642 ULTRASOUND BREAST LIMITED: CPT

## 2025-08-21 PROCEDURE — G0279 TOMOSYNTHESIS, MAMMO: HCPCS

## 2025-08-22 ENCOUNTER — HOSPITAL ENCOUNTER (OUTPATIENT)
Dept: WOMENS IMAGING | Age: 30
Discharge: HOME | End: 2025-08-22
Attending: RADIOLOGY
Payer: COMMERCIAL

## 2025-08-22 DIAGNOSIS — R92.1 BREAST CALCIFICATIONS: ICD-10-CM

## 2025-08-22 DIAGNOSIS — R92.0 MICROCALCIFICATION OF LEFT BREAST ON MAMMOGRAM: ICD-10-CM

## 2025-08-22 PROCEDURE — 2709999900 MAM STEREO BREAST BX W LOC DEVICE 1ST LESION RIGHT

## 2025-08-22 PROCEDURE — 77065 DX MAMMO INCL CAD UNI: CPT

## 2025-08-22 PROCEDURE — 2709999900 MAM STEREO BREAST BX W LOC DEVICE 1ST LESION LEFT

## 2025-08-25 ENCOUNTER — CLINICAL DOCUMENTATION (OUTPATIENT)
Dept: WOMENS IMAGING | Age: 30
End: 2025-08-25

## 2025-08-25 DIAGNOSIS — R92.1 BREAST CALCIFICATIONS: Primary | ICD-10-CM

## 2025-08-25 DIAGNOSIS — Z09 FOLLOW-UP EXAM: ICD-10-CM

## (undated) DEVICE — GLOVE SURG SZ 65 THK91MIL LTX FREE SYN POLYISOPRENE

## (undated) DEVICE — SUTURE VCRL SZ 1 L36IN ABSRB UD CTX L48MM 1/2 CIR J977H

## (undated) DEVICE — GLOVE SURG SENSICARE LT CF LF PF SZ 8.5

## (undated) DEVICE — SOLUTION SCRB 4OZ 4% CHG H2O AIDED FOR PREOPERATIVE SKIN

## (undated) DEVICE — SUTURE VICRYL SZ 4-0 L27IN ABSRB UD L60MM KS STR REV CUT NDL J662H

## (undated) DEVICE — PACK PROCEDURE SURG C SECT SRMC LF

## (undated) DEVICE — SUTURE VICRYL + SZ 1-0 L36IN ABSRB UD CTX 1/2 CIR TAPR PNT VCP977H

## (undated) DEVICE — GLOVE ORANGE PI 7   MSG9070

## (undated) DEVICE — SOLUTION IRRIG 1000ML STRL H2O USP PLAS POUR BTL

## (undated) DEVICE — Device

## (undated) DEVICE — SUTURE ABSRB MFIL VLT CT 3-0 - 27IN PDS II Z338H

## (undated) DEVICE — SOLUTION SURG PREP 26 CC PURPREP

## (undated) DEVICE — SUTURE VCRL + SZ 3-0 L27IN ABSRB WHT CT-1 1/2 CIR VCP258H

## (undated) DEVICE — SUTURE VICRYL + SZ 0 L36IN ABSRB VLT L36MM CT-1 1/2 CIR VCP346H

## (undated) DEVICE — APPLICATOR PREP 26ML 0.7% IOD POVACRYLEX 74% ISO ALC ST

## (undated) DEVICE — SOLUTION IRRIG 1000ML 0.9% SOD CHL USP POUR PLAS BTL

## (undated) DEVICE — DRESSING HYDROFIBER AQUACEL AG ADVANTAGE 3.5X12 IN

## (undated) DEVICE — SUTURE VICRYL PLUS TAPERPOINT ANTIBAC BRD SH NDL 3-0

## (undated) DEVICE — SUTURE CHROMIC GUT SZ 0 L36IN ABSRB BRN CTX L48MM 1/2 CIR 904H